# Patient Record
Sex: FEMALE | Race: WHITE | NOT HISPANIC OR LATINO | Employment: UNEMPLOYED | URBAN - METROPOLITAN AREA
[De-identification: names, ages, dates, MRNs, and addresses within clinical notes are randomized per-mention and may not be internally consistent; named-entity substitution may affect disease eponyms.]

---

## 2017-08-01 LAB — CRC RECOMMENDATION EXT: NORMAL

## 2021-06-04 ENCOUNTER — TELEPHONE (OUTPATIENT)
Dept: DERMATOLOGY | Facility: CLINIC | Age: 56
End: 2021-06-04

## 2022-07-06 ENCOUNTER — OFFICE VISIT (OUTPATIENT)
Dept: FAMILY MEDICINE | Facility: CLINIC | Age: 57
End: 2022-07-06
Payer: MEDICAID

## 2022-07-06 ENCOUNTER — PATIENT MESSAGE (OUTPATIENT)
Dept: FAMILY MEDICINE | Facility: CLINIC | Age: 57
End: 2022-07-06

## 2022-07-06 ENCOUNTER — HOSPITAL ENCOUNTER (OUTPATIENT)
Dept: RADIOLOGY | Facility: HOSPITAL | Age: 57
Discharge: HOME | End: 2022-07-06
Attending: INTERNAL MEDICINE
Payer: MEDICAID

## 2022-07-06 VITALS
SYSTOLIC BLOOD PRESSURE: 130 MMHG | DIASTOLIC BLOOD PRESSURE: 82 MMHG | BODY MASS INDEX: 28.03 KG/M2 | HEART RATE: 77 BPM | HEIGHT: 66 IN | OXYGEN SATURATION: 98 % | WEIGHT: 174.38 LBS

## 2022-07-06 DIAGNOSIS — Z12.31 ENCOUNTER FOR SCREENING MAMMOGRAM FOR BREAST CANCER: ICD-10-CM

## 2022-07-06 DIAGNOSIS — R10.11 RUQ PAIN: ICD-10-CM

## 2022-07-06 DIAGNOSIS — E78.2 MIXED HYPERLIPIDEMIA: ICD-10-CM

## 2022-07-06 DIAGNOSIS — Z23 NEED FOR VACCINATION: ICD-10-CM

## 2022-07-06 DIAGNOSIS — L71.9 ROSACEA: ICD-10-CM

## 2022-07-06 DIAGNOSIS — K80.50 BILIARY COLIC: ICD-10-CM

## 2022-07-06 DIAGNOSIS — Z76.89 ENCOUNTER TO ESTABLISH CARE: Primary | ICD-10-CM

## 2022-07-06 PROCEDURE — 99214 OFFICE O/P EST MOD 30 MIN: CPT | Mod: PBBFAC,PO | Performed by: INTERNAL MEDICINE

## 2022-07-06 PROCEDURE — 99999 PR PBB SHADOW E&M-EST. PATIENT-LVL IV: ICD-10-PCS | Mod: PBBFAC,,, | Performed by: INTERNAL MEDICINE

## 2022-07-06 PROCEDURE — 77067 MAMMO DIGITAL SCREENING BILAT WITH TOMO: ICD-10-PCS | Mod: 26,,, | Performed by: RADIOLOGY

## 2022-07-06 PROCEDURE — 3008F PR BODY MASS INDEX (BMI) DOCUMENTED: ICD-10-PCS | Mod: CPTII,,, | Performed by: INTERNAL MEDICINE

## 2022-07-06 PROCEDURE — 90715 TDAP VACCINE 7 YRS/> IM: CPT | Mod: PBBFAC,PO

## 2022-07-06 PROCEDURE — 1160F RVW MEDS BY RX/DR IN RCRD: CPT | Mod: CPTII,,, | Performed by: INTERNAL MEDICINE

## 2022-07-06 PROCEDURE — 99204 PR OFFICE/OUTPT VISIT, NEW, LEVL IV, 45-59 MIN: ICD-10-PCS | Mod: S$PBB,,, | Performed by: INTERNAL MEDICINE

## 2022-07-06 PROCEDURE — 77063 BREAST TOMOSYNTHESIS BI: CPT | Mod: 26,,, | Performed by: RADIOLOGY

## 2022-07-06 PROCEDURE — 1160F PR REVIEW ALL MEDS BY PRESCRIBER/CLIN PHARMACIST DOCUMENTED: ICD-10-PCS | Mod: CPTII,,, | Performed by: INTERNAL MEDICINE

## 2022-07-06 PROCEDURE — 1159F MED LIST DOCD IN RCRD: CPT | Mod: CPTII,,, | Performed by: INTERNAL MEDICINE

## 2022-07-06 PROCEDURE — 99204 OFFICE O/P NEW MOD 45 MIN: CPT | Mod: S$PBB,,, | Performed by: INTERNAL MEDICINE

## 2022-07-06 PROCEDURE — 77067 SCR MAMMO BI INCL CAD: CPT | Mod: 26,,, | Performed by: RADIOLOGY

## 2022-07-06 PROCEDURE — 3079F DIAST BP 80-89 MM HG: CPT | Mod: CPTII,,, | Performed by: INTERNAL MEDICINE

## 2022-07-06 PROCEDURE — 77063 MAMMO DIGITAL SCREENING BILAT WITH TOMO: ICD-10-PCS | Mod: 26,,, | Performed by: RADIOLOGY

## 2022-07-06 PROCEDURE — 99999 PR PBB SHADOW E&M-EST. PATIENT-LVL IV: CPT | Mod: PBBFAC,,, | Performed by: INTERNAL MEDICINE

## 2022-07-06 PROCEDURE — 77063 BREAST TOMOSYNTHESIS BI: CPT | Mod: TC,PO

## 2022-07-06 PROCEDURE — 3075F SYST BP GE 130 - 139MM HG: CPT | Mod: CPTII,,, | Performed by: INTERNAL MEDICINE

## 2022-07-06 PROCEDURE — 1159F PR MEDICATION LIST DOCUMENTED IN MEDICAL RECORD: ICD-10-PCS | Mod: CPTII,,, | Performed by: INTERNAL MEDICINE

## 2022-07-06 PROCEDURE — 3079F PR MOST RECENT DIASTOLIC BLOOD PRESSURE 80-89 MM HG: ICD-10-PCS | Mod: CPTII,,, | Performed by: INTERNAL MEDICINE

## 2022-07-06 PROCEDURE — 3075F PR MOST RECENT SYSTOLIC BLOOD PRESS GE 130-139MM HG: ICD-10-PCS | Mod: CPTII,,, | Performed by: INTERNAL MEDICINE

## 2022-07-06 PROCEDURE — 77067 SCR MAMMO BI INCL CAD: CPT | Mod: TC,PO

## 2022-07-06 PROCEDURE — 3008F BODY MASS INDEX DOCD: CPT | Mod: CPTII,,, | Performed by: INTERNAL MEDICINE

## 2022-07-06 RX ORDER — METRONIDAZOLE 7.5 MG/G
GEL TOPICAL 2 TIMES DAILY
Qty: 45 G | Refills: 2 | Status: SHIPPED | OUTPATIENT
Start: 2022-07-06 | End: 2023-07-10 | Stop reason: SDUPTHER

## 2022-07-06 RX ORDER — DOXYCYCLINE 100 MG/1
100 CAPSULE ORAL 2 TIMES DAILY
COMMUNITY
Start: 2022-02-22 | End: 2022-07-11

## 2022-07-06 RX ORDER — METRONIDAZOLE 7.5 MG/G
GEL TOPICAL
COMMUNITY
Start: 2021-06-05 | End: 2022-07-11

## 2022-07-06 RX ORDER — METRONIDAZOLE 7.5 MG/G
1 GEL TOPICAL 2 TIMES DAILY
COMMUNITY
Start: 2022-06-07 | End: 2022-07-11

## 2022-07-06 RX ORDER — ATORVASTATIN CALCIUM 20 MG/1
20 TABLET, FILM COATED ORAL DAILY
Qty: 90 TABLET | Refills: 3 | Status: SHIPPED | OUTPATIENT
Start: 2022-07-06 | End: 2023-07-10 | Stop reason: SDUPTHER

## 2022-07-06 RX ORDER — DOXYCYCLINE 100 MG/1
100 CAPSULE ORAL 2 TIMES DAILY
Qty: 180 CAPSULE | Refills: 3 | Status: SHIPPED | OUTPATIENT
Start: 2022-07-06 | End: 2023-07-10 | Stop reason: SDUPTHER

## 2022-07-06 NOTE — PROGRESS NOTES
Subjective     Abigail Smith is a 56 y.o. old, female here for Establish Care and Annual Exam    Patient complains of RUQ discomfort consistently. She has a history of gall stones. She does everything she can diet wise but symptoms are getting worse. Pain radiates up to the shoulder. Diet is very healthy.    She has what sounds like a history of rosacea and has been on doxycycline and metrogel which works well.    She has been on statin therapy for HLD but off meds for awhile, needs refills and update on labs.    She is due for a mammogram. Colonoscopy was done 5 years ago, she has records she can scan in. Last pap smear was a few years ago in [place].    She moved to the area with her  to help take care of her grand daughter.     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    Review of Systems   Constitutional: Negative for chills and fever.   HENT: Negative for ear pain and sinus pain.    Eyes: Negative for blurred vision and pain.   Respiratory: Negative for cough and shortness of breath.    Cardiovascular: Negative for chest pain and palpitations.   Gastrointestinal: Positive for abdominal pain and nausea.   Genitourinary: Negative for dysuria and frequency.   Musculoskeletal: Negative for joint pain and myalgias.   Skin: Negative for itching and rash.   Neurological: Negative for weakness and headaches.   Endo/Heme/Allergies: Negative for environmental allergies. Does not bruise/bleed easily.   Psychiatric/Behavioral: Negative for depression. The patient does not have insomnia.        History reviewed. No pertinent past medical history.  Past Surgical History:   Procedure Laterality Date     SECTION  10/19/1994    HYSTERECTOMY  10/2012    Partial - cervix & ovaries remain    TONSILLECTOMY  2002     Review of patient's allergies indicates:   Allergen Reactions     "Iodine and iodide containing products Hives    Sulfa (sulfonamide antibiotics)     Scallops Nausea Only     Outpatient Medications Marked as Taking for the 7/6/22 encounter (Office Visit) with Paulino Hope MD   Medication Sig Dispense Refill    doxycycline (VIBRAMYCIN) 100 MG Cap Take 100 mg by mouth 2 (two) times daily.      metroNIDAZOLE (METROGEL) 0.75 % gel Apply 1 application topically 2 (two) times daily.      metroNIDAZOLE (METROGEL) 0.75 % gel        Social History     Tobacco Use    Smoking status: Never Smoker   Substance Use Topics    Alcohol use: Yes     Alcohol/week: 3.0 standard drinks     Types: 1 Glasses of wine, 2 Cans of beer per week    Drug use: Never     Family History   Problem Relation Age of Onset    Diabetes Mother     Hearing loss Mother     Hyperlipidemia Mother     Arthritis Father         RA    Hearing loss Father     Arthritis Sister         Diagnosed with RA at 25 years of age     Objective     /82   Pulse 77   Ht 5' 6" (1.676 m)   Wt 79.1 kg (174 lb 6.1 oz)   SpO2 98%   BMI 28.15 kg/m²   Physical Exam  Constitutional:       General: She is not in acute distress.     Appearance: Normal appearance. She is well-developed.   HENT:      Head: Normocephalic and atraumatic.   Eyes:      Conjunctiva/sclera: Conjunctivae normal.      Pupils: Pupils are equal, round, and reactive to light.   Neck:      Thyroid: No thyroid mass or thyromegaly.   Cardiovascular:      Rate and Rhythm: Normal rate and regular rhythm.      Heart sounds: Normal heart sounds. No murmur heard.  Pulmonary:      Effort: No respiratory distress.      Breath sounds: Normal breath sounds.   Chest:   Breasts:      Right: No supraclavicular adenopathy.      Left: No supraclavicular adenopathy.       Abdominal:      General: Bowel sounds are normal.      Palpations: Abdomen is soft.      Tenderness: There is no abdominal tenderness.   Musculoskeletal:         General: No deformity.      " Cervical back: Neck supple.   Lymphadenopathy:      Cervical: No cervical adenopathy.      Upper Body:      Right upper body: No supraclavicular adenopathy.      Left upper body: No supraclavicular adenopathy.   Skin:     General: Skin is warm and dry.      Findings: No rash.   Neurological:      Mental Status: She is alert and oriented to person, place, and time.   Psychiatric:         Behavior: Behavior normal.       Assessment and Plan     Encounter to establish care  -     Comprehensive Metabolic Panel; Future; Expected date: 07/06/2022  -     Lipid Panel; Future; Expected date: 07/06/2022  -     CBC Without Differential; Future; Expected date: 07/06/2022  -     Hemoglobin A1C; Future; Expected date: 07/06/2022    RUQ pain    Biliary colic  -     Ambulatory referral/consult to General Surgery; Future; Expected date: 07/13/2022    Encounter for screening mammogram for breast cancer  -     Mammo Digital Screening Bilat w/ Mark; Future; Expected date: 07/06/2022    Mixed hyperlipidemia  -     atorvastatin (LIPITOR) 20 MG tablet; Take 1 tablet (20 mg total) by mouth once daily.  Dispense: 90 tablet; Refill: 3    Rosacea    Need for vaccination  -     (In Office Administered) Tdap Vaccine    Other orders  -     metroNIDAZOLE (METROGEL) 0.75 % gel; Apply topically 2 (two) times daily.  Dispense: 45 g; Refill: 2  -     doxycycline (MONODOX) 100 MG capsule; Take 1 capsule (100 mg total) by mouth 2 (two) times daily.  Dispense: 180 capsule; Refill: 3      Follow up in about 1 year (around 7/6/2023).    ___________________  Paulion Hope MD  Internal Medicine and Pediatrics

## 2022-07-07 ENCOUNTER — PATIENT OUTREACH (OUTPATIENT)
Dept: ADMINISTRATIVE | Facility: HOSPITAL | Age: 57
End: 2022-07-07
Payer: MEDICAID

## 2022-07-08 ENCOUNTER — PATIENT MESSAGE (OUTPATIENT)
Dept: FAMILY MEDICINE | Facility: CLINIC | Age: 57
End: 2022-07-08
Payer: MEDICAID

## 2022-07-08 ENCOUNTER — TELEPHONE (OUTPATIENT)
Dept: SURGERY | Facility: CLINIC | Age: 57
End: 2022-07-08
Payer: MEDICAID

## 2022-07-08 NOTE — TELEPHONE ENCOUNTER
----- Message from Je Carlos sent at 7/8/2022  4:07 PM CDT -----  Type:  Sooner Appointment Request    Caller is requesting a sooner appointment.  Caller declined first available appointment listed below.  Caller will not accept being placed on the waitlist and is requesting a message be sent to doctor.    Name of Caller:  PT  When is the first available appointment?  unk  Symptoms:  referral gal bladder surgery  Best Call Back Number:  [phone]     Additional Information:  Please advise == thank you

## 2022-07-08 NOTE — TELEPHONE ENCOUNTER
Spoke with patient, scheduled with Dr Champagne on Monday, July 11 at 845 am in the [place] office.

## 2022-07-11 ENCOUNTER — OFFICE VISIT (OUTPATIENT)
Dept: SURGERY | Facility: CLINIC | Age: 57
End: 2022-07-11
Payer: MEDICAID

## 2022-07-11 VITALS — BODY MASS INDEX: 28.15 KG/M2 | HEIGHT: 66 IN

## 2022-07-11 DIAGNOSIS — K80.50 BILIARY COLIC: ICD-10-CM

## 2022-07-11 PROCEDURE — 1159F MED LIST DOCD IN RCRD: CPT | Mod: CPTII,,, | Performed by: SURGERY

## 2022-07-11 PROCEDURE — 1160F RVW MEDS BY RX/DR IN RCRD: CPT | Mod: CPTII,,, | Performed by: SURGERY

## 2022-07-11 PROCEDURE — 99204 OFFICE O/P NEW MOD 45 MIN: CPT | Mod: S$PBB,,, | Performed by: SURGERY

## 2022-07-11 PROCEDURE — 1159F PR MEDICATION LIST DOCUMENTED IN MEDICAL RECORD: ICD-10-PCS | Mod: CPTII,,, | Performed by: SURGERY

## 2022-07-11 PROCEDURE — 1160F PR REVIEW ALL MEDS BY PRESCRIBER/CLIN PHARMACIST DOCUMENTED: ICD-10-PCS | Mod: CPTII,,, | Performed by: SURGERY

## 2022-07-11 PROCEDURE — 3008F BODY MASS INDEX DOCD: CPT | Mod: CPTII,,, | Performed by: SURGERY

## 2022-07-11 PROCEDURE — 3044F PR MOST RECENT HEMOGLOBIN A1C LEVEL <7.0%: ICD-10-PCS | Mod: CPTII,,, | Performed by: SURGERY

## 2022-07-11 PROCEDURE — 3008F PR BODY MASS INDEX (BMI) DOCUMENTED: ICD-10-PCS | Mod: CPTII,,, | Performed by: SURGERY

## 2022-07-11 PROCEDURE — 3044F HG A1C LEVEL LT 7.0%: CPT | Mod: CPTII,,, | Performed by: SURGERY

## 2022-07-11 PROCEDURE — 99204 PR OFFICE/OUTPT VISIT, NEW, LEVL IV, 45-59 MIN: ICD-10-PCS | Mod: S$PBB,,, | Performed by: SURGERY

## 2022-07-11 PROCEDURE — 99999 PR PBB SHADOW E&M-EST. PATIENT-LVL III: ICD-10-PCS | Mod: PBBFAC,,, | Performed by: SURGERY

## 2022-07-11 PROCEDURE — 99999 PR PBB SHADOW E&M-EST. PATIENT-LVL III: CPT | Mod: PBBFAC,,, | Performed by: SURGERY

## 2022-07-11 PROCEDURE — 99213 OFFICE O/P EST LOW 20 MIN: CPT | Mod: PBBFAC,PO | Performed by: SURGERY

## 2022-07-11 RX ORDER — SODIUM CHLORIDE 9 MG/ML
INJECTION, SOLUTION INTRAVENOUS CONTINUOUS
Status: CANCELLED | OUTPATIENT
Start: 2022-07-11

## 2022-07-11 RX ORDER — METRONIDAZOLE 500 MG/100ML
500 INJECTION, SOLUTION INTRAVENOUS
Status: CANCELLED | OUTPATIENT
Start: 2022-07-11

## 2022-07-11 NOTE — PROGRESS NOTES
Subjective:       Patient ID: Abigail Smith is a 56 y.o. female.    Chief Complaint: Consult (ZBIGNIEW)      HPI  he 56-year-old female presents with a complaint of right upper quadrant pain which radiates into her back.  She has known cholelithiasis from her previous ultrasound but I do not have that report.  It was done in [place].  This has been going on for 8 years.  She has some associated diarrhea but no nausea or vomiting.  She denies any fever or chills with these attacks..  This happens about 3-4 times per year sometimes the attacks last as long as 2 weeks.  It can be brought on by food, especially fatty food and knots.  She lives in [place] 6 months of the year is a  this is where her ultrasounds were done.  She would like to have something done about this.    History reviewed. No pertinent past medical history.  Past Surgical History:   Procedure Laterality Date     SECTION  10/19/1994    HYSTERECTOMY  10/2012    Partial - cervix & ovaries remain    TONSILLECTOMY  2002         Current Outpatient Medications:     atorvastatin (LIPITOR) 20 MG tablet, Take 1 tablet (20 mg total) by mouth once daily., Disp: 90 tablet, Rfl: 3    doxycycline (MONODOX) 100 MG capsule, Take 1 capsule (100 mg total) by mouth 2 (two) times daily., Disp: 180 capsule, Rfl: 3    metroNIDAZOLE (METROGEL) 0.75 % gel, Apply topically 2 (two) times daily., Disp: 45 g, Rfl: 2    Review of patient's allergies indicates:   Allergen Reactions    Iodine and iodide containing products Hives    Sulfa (sulfonamide antibiotics)     Scallops Nausea Only       Family History   Problem Relation Age of Onset    Diabetes Mother     Hearing loss Mother     Hyperlipidemia Mother     Arthritis Father         RA    Hearing loss Father     Arthritis Sister         Diagnosed with RA at 25 years of age     Social History     Socioeconomic History    Marital status:    Tobacco Use    Smoking status: Never Smoker     Smokeless tobacco: Never Used   Substance and Sexual Activity    Alcohol use: Yes     Alcohol/week: 3.0 standard drinks     Types: 1 Glasses of wine, 2 Cans of beer per week    Drug use: Never    Sexual activity: Yes     Partners: Male     Birth control/protection: Partner-Vasectomy       Review of Systems   Constitutional: Negative.    Respiratory: Negative.    Cardiovascular: Negative.    Gastrointestinal: Positive for abdominal pain and diarrhea.   Genitourinary: Negative.    Neurological: Negative.      Objective:     Physical Exam  Constitutional:       General: She is not in acute distress.     Appearance: She is well-developed.   HENT:      Head: Normocephalic and atraumatic.   Eyes:      General: No scleral icterus.     Conjunctiva/sclera: Conjunctivae normal.      Pupils: Pupils are equal, round, and reactive to light.   Neck:      Thyroid: No thyromegaly.   Cardiovascular:      Rate and Rhythm: Normal rate and regular rhythm.      Heart sounds: No murmur heard.  Pulmonary:      Effort: Pulmonary effort is normal.      Breath sounds: Normal breath sounds. No wheezing or rales.   Abdominal:      General: Bowel sounds are normal. There is no distension.      Palpations: Abdomen is soft. There is no mass.      Tenderness: There is abdominal tenderness. There is no guarding (Right upper quadrant tenderness.) or rebound.      Hernia: No hernia is present.   Musculoskeletal:         General: Normal range of motion.      Cervical back: Normal range of motion.   Lymphadenopathy:      Cervical: No cervical adenopathy.   Skin:     General: Skin is warm and dry.      Findings: No erythema or rash.   Neurological:      Mental Status: She is alert and oriented to person, place, and time.   Psychiatric:         Behavior: Behavior normal.       Assessment:     Encounter Diagnosis   Name Primary?    Biliary colic        Plan:      1. Abdominal ultrasound.  2. Will plan to proceed with laparoscopic cholecystectomy  pending that ultrasound.  3. Risks and benefits of the planned procedure were discussed at length with the patient.  Risks and benefits of not proceeding with the procedure were discussed as well. All questions were answered. The patient expressed clear understanding and would like to proceed with the procedure as discussed.

## 2022-07-11 NOTE — H&P (VIEW-ONLY)
Subjective:       Patient ID: Abigail Smith is a 56 y.o. female.    Chief Complaint: Consult (ZBIGNIEW)      HPI  he 56-year-old female presents with a complaint of right upper quadrant pain which radiates into her back.  She has known cholelithiasis from her previous ultrasound but I do not have that report.  It was done in [place].  This has been going on for 8 years.  She has some associated diarrhea but no nausea or vomiting.  She denies any fever or chills with these attacks..  This happens about 3-4 times per year sometimes the attacks last as long as 2 weeks.  It can be brought on by food, especially fatty food and knots.  She lives in [place] 6 months of the year is a  this is where her ultrasounds were done.  She would like to have something done about this.    History reviewed. No pertinent past medical history.  Past Surgical History:   Procedure Laterality Date     SECTION  10/19/1994    HYSTERECTOMY  10/2012    Partial - cervix & ovaries remain    TONSILLECTOMY  2002         Current Outpatient Medications:     atorvastatin (LIPITOR) 20 MG tablet, Take 1 tablet (20 mg total) by mouth once daily., Disp: 90 tablet, Rfl: 3    doxycycline (MONODOX) 100 MG capsule, Take 1 capsule (100 mg total) by mouth 2 (two) times daily., Disp: 180 capsule, Rfl: 3    metroNIDAZOLE (METROGEL) 0.75 % gel, Apply topically 2 (two) times daily., Disp: 45 g, Rfl: 2    Review of patient's allergies indicates:   Allergen Reactions    Iodine and iodide containing products Hives    Sulfa (sulfonamide antibiotics)     Scallops Nausea Only       Family History   Problem Relation Age of Onset    Diabetes Mother     Hearing loss Mother     Hyperlipidemia Mother     Arthritis Father         RA    Hearing loss Father     Arthritis Sister         Diagnosed with RA at 25 years of age     Social History     Socioeconomic History    Marital status:    Tobacco Use    Smoking status: Never Smoker     Smokeless tobacco: Never Used   Substance and Sexual Activity    Alcohol use: Yes     Alcohol/week: 3.0 standard drinks     Types: 1 Glasses of wine, 2 Cans of beer per week    Drug use: Never    Sexual activity: Yes     Partners: Male     Birth control/protection: Partner-Vasectomy       Review of Systems   Constitutional: Negative.    Respiratory: Negative.    Cardiovascular: Negative.    Gastrointestinal: Positive for abdominal pain and diarrhea.   Genitourinary: Negative.    Neurological: Negative.      Objective:     Physical Exam  Constitutional:       General: She is not in acute distress.     Appearance: She is well-developed.   HENT:      Head: Normocephalic and atraumatic.   Eyes:      General: No scleral icterus.     Conjunctiva/sclera: Conjunctivae normal.      Pupils: Pupils are equal, round, and reactive to light.   Neck:      Thyroid: No thyromegaly.   Cardiovascular:      Rate and Rhythm: Normal rate and regular rhythm.      Heart sounds: No murmur heard.  Pulmonary:      Effort: Pulmonary effort is normal.      Breath sounds: Normal breath sounds. No wheezing or rales.   Abdominal:      General: Bowel sounds are normal. There is no distension.      Palpations: Abdomen is soft. There is no mass.      Tenderness: There is abdominal tenderness. There is no guarding (Right upper quadrant tenderness.) or rebound.      Hernia: No hernia is present.   Musculoskeletal:         General: Normal range of motion.      Cervical back: Normal range of motion.   Lymphadenopathy:      Cervical: No cervical adenopathy.   Skin:     General: Skin is warm and dry.      Findings: No erythema or rash.   Neurological:      Mental Status: She is alert and oriented to person, place, and time.   Psychiatric:         Behavior: Behavior normal.       Assessment:     Encounter Diagnosis   Name Primary?    Biliary colic        Plan:      1. Abdominal ultrasound.  2. Will plan to proceed with laparoscopic cholecystectomy  pending that ultrasound.  3. Risks and benefits of the planned procedure were discussed at length with the patient.  Risks and benefits of not proceeding with the procedure were discussed as well. All questions were answered. The patient expressed clear understanding and would like to proceed with the procedure as discussed.

## 2022-07-11 NOTE — PATIENT INSTRUCTIONS
Surgery is scheduled for 8/8/22 arrival time will be given by the the preop nurse.  The preop nurse will call you from [phone]  Nothing to eat or drink after midnight.  Someone to drive you home if you are same day surgery.    THE PREOP NURSE WILL CALL, SOMETIMES AS LATE AS 4 or 5 PM IN THE AFTERNOON THE DAY BEFORE SURGERY.    Bathe the night before and the morning of your procedure with a Chlorhexidine wash such as Hibiclens, can be purchased at most Pharmacy's no prescription needed.    Special Instruction:     Your surgery is scheduled at the [facility] at [address] [facility] [address] in [place] on the first floor.      Contact Toño Wiseman LPN for any questions or concerns. [phone]

## 2022-07-12 ENCOUNTER — HOSPITAL ENCOUNTER (OUTPATIENT)
Dept: RADIOLOGY | Facility: HOSPITAL | Age: 57
Discharge: HOME | End: 2022-07-12
Attending: INTERNAL MEDICINE
Payer: MEDICAID

## 2022-07-12 ENCOUNTER — HOSPITAL ENCOUNTER (OUTPATIENT)
Dept: RADIOLOGY | Facility: HOSPITAL | Age: 57
Discharge: HOME | End: 2022-07-12
Attending: SURGERY
Payer: MEDICAID

## 2022-07-12 DIAGNOSIS — K80.50 BILIARY COLIC: ICD-10-CM

## 2022-07-12 DIAGNOSIS — R92.8 ABNORMAL MAMMOGRAM OF RIGHT BREAST: ICD-10-CM

## 2022-07-12 PROCEDURE — 77065 MAMMO DIGITAL DIAGNOSTIC RIGHT WITH TOMO: ICD-10-PCS | Mod: 26,RT,, | Performed by: RADIOLOGY

## 2022-07-12 PROCEDURE — 77061 BREAST TOMOSYNTHESIS UNI: CPT | Mod: 26,RT,, | Performed by: RADIOLOGY

## 2022-07-12 PROCEDURE — 77065 DX MAMMO INCL CAD UNI: CPT | Mod: TC,PO,RT

## 2022-07-12 PROCEDURE — 77065 DX MAMMO INCL CAD UNI: CPT | Mod: 26,RT,, | Performed by: RADIOLOGY

## 2022-07-12 PROCEDURE — 76642 ULTRASOUND BREAST LIMITED: CPT | Mod: 26,RT,, | Performed by: RADIOLOGY

## 2022-07-12 PROCEDURE — 76705 ECHO EXAM OF ABDOMEN: CPT | Mod: TC,PO

## 2022-07-12 PROCEDURE — 76705 ECHO EXAM OF ABDOMEN: CPT | Mod: 26,,, | Performed by: RADIOLOGY

## 2022-07-12 PROCEDURE — 76642 US BREAST RIGHT LIMITED: ICD-10-PCS | Mod: 26,RT,, | Performed by: RADIOLOGY

## 2022-07-12 PROCEDURE — 76642 ULTRASOUND BREAST LIMITED: CPT | Mod: TC,PO,RT

## 2022-07-12 PROCEDURE — 77061 MAMMO DIGITAL DIAGNOSTIC RIGHT WITH TOMO: ICD-10-PCS | Mod: 26,RT,, | Performed by: RADIOLOGY

## 2022-07-12 PROCEDURE — 76705 US ABDOMEN LIMITED_GALLBLADDER: ICD-10-PCS | Mod: 26,,, | Performed by: RADIOLOGY

## 2022-08-05 ENCOUNTER — ANESTHESIA EVENT (OUTPATIENT)
Dept: SURGERY | Facility: HOSPITAL | Age: 57
End: 2022-08-05
Payer: MEDICAID

## 2022-08-08 ENCOUNTER — HOSPITAL ENCOUNTER (OUTPATIENT)
Facility: HOSPITAL | Age: 57
Discharge: HOME | End: 2022-08-08
Attending: SURGERY | Admitting: SURGERY
Payer: MEDICAID

## 2022-08-08 ENCOUNTER — ANESTHESIA (OUTPATIENT)
Dept: SURGERY | Facility: HOSPITAL | Age: 57
End: 2022-08-08
Payer: MEDICAID

## 2022-08-08 DIAGNOSIS — K80.50 BILIARY COLIC: Primary | ICD-10-CM

## 2022-08-08 PROCEDURE — 88304 PR  SURG PATH,LEVEL III: ICD-10-PCS | Mod: 26,,, | Performed by: PATHOLOGY

## 2022-08-08 PROCEDURE — 37000008 HC ANESTHESIA 1ST 15 MINUTES: Mod: PO | Performed by: SURGERY

## 2022-08-08 PROCEDURE — D9220A PRA ANESTHESIA: ICD-10-PCS | Mod: CRNA,,, | Performed by: NURSE ANESTHETIST, CERTIFIED REGISTERED

## 2022-08-08 PROCEDURE — 71000033 HC RECOVERY, INTIAL HOUR: Mod: PO | Performed by: SURGERY

## 2022-08-08 PROCEDURE — 36000709 HC OR TIME LEV III EA ADD 15 MIN: Mod: PO | Performed by: SURGERY

## 2022-08-08 PROCEDURE — 88304 TISSUE EXAM BY PATHOLOGIST: CPT | Performed by: PATHOLOGY

## 2022-08-08 PROCEDURE — 63600175 PHARM REV CODE 636 W HCPCS: Mod: PO | Performed by: ANESTHESIOLOGY

## 2022-08-08 PROCEDURE — 88304 TISSUE EXAM BY PATHOLOGIST: CPT | Mod: 26,,, | Performed by: PATHOLOGY

## 2022-08-08 PROCEDURE — 36000708 HC OR TIME LEV III 1ST 15 MIN: Mod: PO | Performed by: SURGERY

## 2022-08-08 PROCEDURE — 00790 ANES IPER UPR ABD NOS: CPT | Mod: PO | Performed by: SURGERY

## 2022-08-08 PROCEDURE — D9220A PRA ANESTHESIA: Mod: CRNA,,, | Performed by: NURSE ANESTHETIST, CERTIFIED REGISTERED

## 2022-08-08 PROCEDURE — 63600175 PHARM REV CODE 636 W HCPCS: Mod: PO | Performed by: SURGERY

## 2022-08-08 PROCEDURE — S0030 INJECTION, METRONIDAZOLE: HCPCS | Mod: PO | Performed by: SURGERY

## 2022-08-08 PROCEDURE — 47562 LAPAROSCOPIC CHOLECYSTECTOMY: CPT | Mod: ,,, | Performed by: SURGERY

## 2022-08-08 PROCEDURE — D9220A PRA ANESTHESIA: Mod: ANES,,, | Performed by: ANESTHESIOLOGY

## 2022-08-08 PROCEDURE — 63600175 PHARM REV CODE 636 W HCPCS: Mod: PO | Performed by: NURSE ANESTHETIST, CERTIFIED REGISTERED

## 2022-08-08 PROCEDURE — 25000003 PHARM REV CODE 250: Mod: PO | Performed by: NURSE ANESTHETIST, CERTIFIED REGISTERED

## 2022-08-08 PROCEDURE — 27201423 OPTIME MED/SURG SUP & DEVICES STERILE SUPPLY: Mod: PO | Performed by: SURGERY

## 2022-08-08 PROCEDURE — 25000003 PHARM REV CODE 250: Mod: PO | Performed by: ANESTHESIOLOGY

## 2022-08-08 PROCEDURE — D9220A PRA ANESTHESIA: ICD-10-PCS | Mod: ANES,,, | Performed by: ANESTHESIOLOGY

## 2022-08-08 PROCEDURE — 47562 PR LAP,CHOLECYSTECTOMY: ICD-10-PCS | Mod: ,,, | Performed by: SURGERY

## 2022-08-08 PROCEDURE — 25000003 PHARM REV CODE 250: Mod: PO | Performed by: SURGERY

## 2022-08-08 PROCEDURE — 37000009 HC ANESTHESIA EA ADD 15 MINS: Mod: PO | Performed by: SURGERY

## 2022-08-08 RX ORDER — EPHEDRINE SULFATE 50 MG/ML
INJECTION, SOLUTION INTRAVENOUS
Status: DISCONTINUED | OUTPATIENT
Start: 2022-08-08 | End: 2022-08-08

## 2022-08-08 RX ORDER — HYDROCODONE BITARTRATE AND ACETAMINOPHEN 5; 325 MG/1; MG/1
1 TABLET ORAL EVERY 6 HOURS PRN
Qty: 20 TABLET | Refills: 0 | Status: SHIPPED | OUTPATIENT
Start: 2022-08-08 | End: 2023-07-10

## 2022-08-08 RX ORDER — SODIUM CHLORIDE 9 MG/ML
INJECTION, SOLUTION INTRAVENOUS CONTINUOUS
Status: DISCONTINUED | OUTPATIENT
Start: 2022-08-08 | End: 2022-08-08

## 2022-08-08 RX ORDER — FENTANYL CITRATE 50 UG/ML
25 INJECTION, SOLUTION INTRAMUSCULAR; INTRAVENOUS EVERY 5 MIN PRN
Status: DISCONTINUED | OUTPATIENT
Start: 2022-08-08 | End: 2022-08-08 | Stop reason: HOSPADM

## 2022-08-08 RX ORDER — FENTANYL CITRATE 50 UG/ML
INJECTION, SOLUTION INTRAMUSCULAR; INTRAVENOUS
Status: DISCONTINUED | OUTPATIENT
Start: 2022-08-08 | End: 2022-08-08

## 2022-08-08 RX ORDER — DEXAMETHASONE SODIUM PHOSPHATE 4 MG/ML
8 INJECTION, SOLUTION INTRA-ARTICULAR; INTRALESIONAL; INTRAMUSCULAR; INTRAVENOUS; SOFT TISSUE
Status: COMPLETED | OUTPATIENT
Start: 2022-08-08 | End: 2022-08-08

## 2022-08-08 RX ORDER — ONDANSETRON 2 MG/ML
INJECTION INTRAMUSCULAR; INTRAVENOUS
Status: DISCONTINUED | OUTPATIENT
Start: 2022-08-08 | End: 2022-08-08

## 2022-08-08 RX ORDER — SCOLOPAMINE TRANSDERMAL SYSTEM 1 MG/1
1 PATCH, EXTENDED RELEASE TRANSDERMAL
Status: DISCONTINUED | OUTPATIENT
Start: 2022-08-08 | End: 2022-08-08 | Stop reason: HOSPADM

## 2022-08-08 RX ORDER — METRONIDAZOLE 500 MG/100ML
500 INJECTION, SOLUTION INTRAVENOUS
Status: COMPLETED | OUTPATIENT
Start: 2022-08-08 | End: 2022-08-08

## 2022-08-08 RX ORDER — MIDAZOLAM HYDROCHLORIDE 1 MG/ML
INJECTION INTRAMUSCULAR; INTRAVENOUS
Status: DISCONTINUED | OUTPATIENT
Start: 2022-08-08 | End: 2022-08-08

## 2022-08-08 RX ORDER — LIDOCAINE HCL/PF 100 MG/5ML
SYRINGE (ML) INTRAVENOUS
Status: DISCONTINUED | OUTPATIENT
Start: 2022-08-08 | End: 2022-08-08

## 2022-08-08 RX ORDER — HYDROMORPHONE HYDROCHLORIDE 2 MG/ML
0.2 INJECTION, SOLUTION INTRAMUSCULAR; INTRAVENOUS; SUBCUTANEOUS EVERY 5 MIN PRN
Status: DISCONTINUED | OUTPATIENT
Start: 2022-08-08 | End: 2022-08-08 | Stop reason: HOSPADM

## 2022-08-08 RX ORDER — PROPOFOL 10 MG/ML
VIAL (ML) INTRAVENOUS
Status: DISCONTINUED | OUTPATIENT
Start: 2022-08-08 | End: 2022-08-08

## 2022-08-08 RX ORDER — LIDOCAINE HYDROCHLORIDE 10 MG/ML
1 INJECTION, SOLUTION EPIDURAL; INFILTRATION; INTRACAUDAL; PERINEURAL ONCE
Status: DISCONTINUED | OUTPATIENT
Start: 2022-08-08 | End: 2022-08-08 | Stop reason: HOSPADM

## 2022-08-08 RX ORDER — KETAMINE HCL IN 0.9 % NACL 50 MG/5 ML
SYRINGE (ML) INTRAVENOUS
Status: DISCONTINUED | OUTPATIENT
Start: 2022-08-08 | End: 2022-08-08

## 2022-08-08 RX ORDER — SODIUM CHLORIDE 9 MG/ML
INJECTION, SOLUTION INTRAVENOUS CONTINUOUS
Status: DISCONTINUED | OUTPATIENT
Start: 2022-08-08 | End: 2022-08-08 | Stop reason: HOSPADM

## 2022-08-08 RX ORDER — SODIUM CHLORIDE, SODIUM LACTATE, POTASSIUM CHLORIDE, CALCIUM CHLORIDE 600; 310; 30; 20 MG/100ML; MG/100ML; MG/100ML; MG/100ML
INJECTION, SOLUTION INTRAVENOUS CONTINUOUS
Status: DISCONTINUED | OUTPATIENT
Start: 2022-08-08 | End: 2022-08-08 | Stop reason: HOSPADM

## 2022-08-08 RX ORDER — BUPIVACAINE HYDROCHLORIDE AND EPINEPHRINE 2.5; 5 MG/ML; UG/ML
INJECTION, SOLUTION EPIDURAL; INFILTRATION; INTRACAUDAL; PERINEURAL
Status: DISCONTINUED | OUTPATIENT
Start: 2022-08-08 | End: 2022-08-08 | Stop reason: HOSPADM

## 2022-08-08 RX ORDER — ROCURONIUM BROMIDE 10 MG/ML
INJECTION, SOLUTION INTRAVENOUS
Status: DISCONTINUED | OUTPATIENT
Start: 2022-08-08 | End: 2022-08-08

## 2022-08-08 RX ORDER — OXYCODONE HYDROCHLORIDE 5 MG/1
5 TABLET ORAL
Status: DISCONTINUED | OUTPATIENT
Start: 2022-08-08 | End: 2022-08-08 | Stop reason: HOSPADM

## 2022-08-08 RX ORDER — ACETAMINOPHEN 10 MG/ML
INJECTION, SOLUTION INTRAVENOUS
Status: DISCONTINUED | OUTPATIENT
Start: 2022-08-08 | End: 2022-08-08

## 2022-08-08 RX ORDER — KETOROLAC TROMETHAMINE 30 MG/ML
INJECTION, SOLUTION INTRAMUSCULAR; INTRAVENOUS
Status: DISCONTINUED | OUTPATIENT
Start: 2022-08-08 | End: 2022-08-08

## 2022-08-08 RX ADMIN — ONDANSETRON 4 MG: 2 INJECTION, SOLUTION INTRAMUSCULAR; INTRAVENOUS at 12:08

## 2022-08-08 RX ADMIN — KETOROLAC TROMETHAMINE 30 MG: 30 INJECTION, SOLUTION INTRAMUSCULAR at 12:08

## 2022-08-08 RX ADMIN — EPHEDRINE SULFATE 15 MG: 50 INJECTION INTRAVENOUS at 01:08

## 2022-08-08 RX ADMIN — PROPOFOL 170 MG: 10 INJECTION, EMULSION INTRAVENOUS at 12:08

## 2022-08-08 RX ADMIN — CEFTRIAXONE 2 G: 2 INJECTION, SOLUTION INTRAVENOUS at 12:08

## 2022-08-08 RX ADMIN — ROCURONIUM BROMIDE 30 MG: 10 INJECTION, SOLUTION INTRAVENOUS at 12:08

## 2022-08-08 RX ADMIN — FENTANYL CITRATE 100 MCG: 50 INJECTION, SOLUTION INTRAMUSCULAR; INTRAVENOUS at 12:08

## 2022-08-08 RX ADMIN — SCOPALAMINE 1 PATCH: 1 PATCH, EXTENDED RELEASE TRANSDERMAL at 11:08

## 2022-08-08 RX ADMIN — ACETAMINOPHEN 1000 MG: 10 INJECTION, SOLUTION INTRAVENOUS at 12:08

## 2022-08-08 RX ADMIN — Medication 25 MG: at 12:08

## 2022-08-08 RX ADMIN — METRONIDAZOLE 500 MG: 5 INJECTION, SOLUTION INTRAVENOUS at 11:08

## 2022-08-08 RX ADMIN — SUGAMMADEX 200 MG: 100 INJECTION, SOLUTION INTRAVENOUS at 01:08

## 2022-08-08 RX ADMIN — SODIUM CHLORIDE, SODIUM LACTATE, POTASSIUM CHLORIDE, AND CALCIUM CHLORIDE: .6; .31; .03; .02 INJECTION, SOLUTION INTRAVENOUS at 11:08

## 2022-08-08 RX ADMIN — DEXAMETHASONE SODIUM PHOSPHATE 8 MG: 4 INJECTION INTRA-ARTICULAR; INTRALESIONAL; INTRAMUSCULAR; INTRAVENOUS; SOFT TISSUE at 11:08

## 2022-08-08 RX ADMIN — LIDOCAINE HYDROCHLORIDE 100 MG: 20 INJECTION, SOLUTION INTRAVENOUS at 12:08

## 2022-08-08 RX ADMIN — MIDAZOLAM HYDROCHLORIDE 2 MG: 1 INJECTION, SOLUTION INTRAMUSCULAR; INTRAVENOUS at 12:08

## 2022-08-08 NOTE — ANESTHESIA POSTPROCEDURE EVALUATION
Anesthesia Post Evaluation    Patient: Abigail Smith    Procedure(s) Performed: Procedure(s) (LRB):  CHOLECYSTECTOMY, LAPAROSCOPIC (N/A)    Final Anesthesia Type: general      Patient location during evaluation: PACU  Patient participation: Yes- Able to Participate  Level of consciousness: awake and alert and oriented  Post-procedure vital signs: reviewed and stable  Pain management: adequate  Airway patency: patent    PONV status at discharge: No PONV  Anesthetic complications: no      Cardiovascular status: blood pressure returned to baseline and stable  Respiratory status: unassisted and spontaneous ventilation  Hydration status: euvolemic  Follow-up not needed.          Vitals Value Taken Time   /67 08/08/22 1339   Temp 36.2 °C (97.1 °F) 08/08/22 1326   Pulse 72 08/08/22 1339   Resp 18 08/08/22 1339   SpO2 96 % 08/08/22 1339         No case tracking events are documented in the log.      Pain/Aiyana Score: Aiyana Score: 10 (8/8/2022  1:45 PM)

## 2022-08-08 NOTE — ANESTHESIA PREPROCEDURE EVALUATION
08/08/2022  Abigail Smith is a 57 y.o., female.      Pre-op Assessment    I have reviewed the Patient Summary Reports.     I have reviewed the Nursing Notes. I have reviewed the NPO Status.   I have reviewed the Medications.     Review of Systems  Anesthesia Hx:  No problems with previous Anesthesia    Social:  Non-Smoker    Cardiovascular:   hyperlipidemia    Pulmonary:  Pulmonary Normal    Renal/:  Renal/ Normal     Neurological:  Neurology Normal    Endocrine:   Diabetes, well controlled, type 2        Physical Exam  General: Well nourished, Cooperative, Alert and Oriented    Airway:  Mallampati: I   Mouth Opening: Normal  Neck ROM: Normal ROM        Anesthesia Plan  Type of Anesthesia, risks & benefits discussed:    Anesthesia Type: Gen ETT, Gen Supraglottic Airway, Gen Natural Airway, MAC  Intra-op Monitoring Plan: Standard ASA Monitors  Post Op Pain Control Plan: multimodal analgesia  Induction:  IV  Airway Plan: Direct, Video and Fiberoptic, Post-Induction  Informed Consent: Informed consent signed with the Patient and all parties understand the risks and agree with anesthesia plan.  All questions answered.   ASA Score: 3    Ready For Surgery From Anesthesia Perspective.     .

## 2022-08-08 NOTE — TRANSFER OF CARE
"Anesthesia Transfer of Care Note    Patient: Abigail Smith    Procedure(s) Performed: Procedure(s) (LRB):  CHOLECYSTECTOMY, LAPAROSCOPIC (N/A)    Patient location: PACU    Anesthesia Type: general    Transport from OR: Transported from OR on room air with adequate spontaneous ventilation    Post pain: adequate analgesia    Post assessment: no apparent anesthetic complications and tolerated procedure well    Post vital signs: stable    Level of consciousness: sedated and awake    Nausea/Vomiting: no nausea/vomiting    Complications: none    Transfer of care protocol was followed      Last vitals:   Visit Vitals  /65   Pulse 73   Temp 36.2 °C (97.1 °F) (Skin)   Resp 19   Ht 5' 6" (1.676 m)   Wt 78.9 kg (174 lb)   SpO2 (!) 93%   Breastfeeding No   BMI 28.08 kg/m²     "

## 2022-08-08 NOTE — DISCHARGE SUMMARY
Discharge Summary  General Surgery      Admit Date: 8/8/2022    Discharge Date :8/8/2022    Attending Physician: Justin Champagne     Discharge Physician: Justin Champagne    Discharged Condition: good    Discharge Diagnosis: Biliary colic [K80.50]    Treatments/Procedures: Procedure(s) (LRB):  CHOLECYSTECTOMY, LAPAROSCOPIC (N/A)    Hospital Course: Uneventful; Discharged home from Recovery    Significant Diagnostic Studies: none    Disposition: Home or Self Care    Diet: Regular    Follow up: Office 10-14 days    Activity: No heavy lifting till seen in office.    Patient Instructions:   Current Discharge Medication List      START taking these medications    Details   HYDROcodone-acetaminophen (NORCO) 5-325 mg per tablet Take 1 tablet by mouth every 6 (six) hours as needed for Pain.  Qty: 20 tablet, Refills: 0    Comments: Quantity prescribed more than 7 day supply? No         CONTINUE these medications which have NOT CHANGED    Details   atorvastatin (LIPITOR) 20 MG tablet Take 1 tablet (20 mg total) by mouth once daily.  Qty: 90 tablet, Refills: 3    Associated Diagnoses: Mixed hyperlipidemia      doxycycline (MONODOX) 100 MG capsule Take 1 capsule (100 mg total) by mouth 2 (two) times daily.  Qty: 180 capsule, Refills: 3      metroNIDAZOLE (METROGEL) 0.75 % gel Apply topically 2 (two) times daily.  Qty: 45 g, Refills: 2             Discharge Procedure Orders   Diet general

## 2022-08-08 NOTE — ANESTHESIA PROCEDURE NOTES
Intubation    Date/Time: 8/8/2022 12:22 PM  Performed by: Maynor Delaney CRNA  Authorized by: Je Machado MD     Intubation:     Induction:  Intravenous    Intubated:  Postinduction    Mask Ventilation:  Easy mask    Attempts:  1    Attempted By:  CRNA    Method of Intubation:  Video laryngoscopy    Blade:  Hummel 3    Laryngeal View Grade: Grade I - full view of cords      Difficult Airway Encountered?: No      Complications:  None    Airway Device:  Oral endotracheal tube    Airway Device Size:  7.0    Style/Cuff Inflation:  Cuffed    Inflation Amount (mL):  5    Tube secured:  21    Secured at:  The lips    Placement Verified By:  Capnometry    Complicating Factors:  None    Findings Post-Intubation:  BS equal bilateral and atraumatic/condition of teeth unchanged

## 2022-08-08 NOTE — DISCHARGE INSTRUCTIONS
Department of General Surgery  [facility]  LAPAROSCOPIC CHOLECYSTECTOMY    DO:  Minimal activity for 48 hours.  Resume diet as tolerated.  May shower in 48 hours.  May remove outer dressing in 48 hours. Leave steri-strips in place. If steri-strips get wet, pat them dry. If they fall off, do not worry. They will fall off on their own. Do not pull them off.  Resume home medications as prescribed.    DO NOT:  Do not lift anything over 15 pounds until you have been released by your physician.  Do not soak in tub or pool.  Do not drive for 24 hours or while taking narcotic pain medication.  Do not take additional tylenol/acetaminophen while taking narcotic pain medication that contains tylenol/acetaminophen.     CALL PHYSICIAN FOR:  Redness, swelling, or bleeding from surgical site.  Fever greater than 101.  Drainage from the incision site.  Persistent pain not relieved by pain medication.    FOLLOW-UP APPOINTMENT: Call to schedule appointment in 10-14 days.    FOR EMERGENCIES: Contact your doctor at [phone].

## 2022-08-08 NOTE — OP NOTE
PATIENT NAME:  Abigail Smith     DATE OF PROCEDURE: 8/8/2022    PROCEDURE: Laparoscopic Cholecystectomy    PREOPERATIVE DIAGNOSIS: Cholelithiasis / Cholecystitis   POSTOPERATIVE DIAGNOSIS: Cholelithiasis / Cholecystitis     SURGEON: Justin Ambrocio Jr., M.D.  ASSISTANT: None     DESCRIPTION OF PROCEDURE: After appropriate consent was obtained, consent forms   signed and questions answered, the patient was taken to the Operating Room and   placed on the operating room table where general endotracheal anesthesia was   induced without difficulty. Preoperative antibiotics were administered. SCDs were in   place. A Timeout procedure was performed. The abdomen was prepped and draped in the usual sterile fashion. A midline incision was made beneath the umbilicus. Dissection was performed down to the fascia, which was incised. Stay sutures were placed on the fascia and the Jamin trocar was inserted. The abdomen was insufflated. Under direct   vision and after injection with local anesthetic, a 5 mm trocar was placed in   the upper midline. A second 5 mm trocar was placed between these two. The   gallbladder was identified, grasped, and retracted. There was some mild to moderate  inflammation. The cystic duct was identified and carefully dissected. Three clips   were placed on the common duct side, 2 on the gallbladder side, and the duct was   transected. The artery was identified, dissected, clipped and cut as well. The   gallbladder was then dissected free of the liver bed with electrocautery. It was   placed in a retrieval bag and removed through the umbilical port site. No bile   was spilled. The gallbladder fossa was examined and found to be hemostatic. The trocars were then removed under direct vision.The abdomen was desufflated. The fascia at the umbilicus was closed with interrupted 0 Vicryl suture and additional local was injected. The skin was then closed with 4-0 Monocryl subcuticular stitches. Steri-Strips were  then applied to all incisions. The patient was awakened, extubated and transferred to the Recovery Room in good condition. The patient tolerated the procedure without complication. Lap and instrument counts were reported as correct at the termination of the procedure.    EBL: 10 cc  SPECIMEN: gallbladder  COMPLICATIONS: none  ANESTHESIA: General

## 2022-08-09 VITALS
DIASTOLIC BLOOD PRESSURE: 67 MMHG | SYSTOLIC BLOOD PRESSURE: 139 MMHG | TEMPERATURE: 97 F | HEART RATE: 72 BPM | BODY MASS INDEX: 27.97 KG/M2 | OXYGEN SATURATION: 96 % | WEIGHT: 174 LBS | HEIGHT: 66 IN | RESPIRATION RATE: 18 BRPM

## 2022-08-11 LAB
FINAL PATHOLOGIC DIAGNOSIS: NORMAL
GROSS: NORMAL
Lab: NORMAL

## 2022-08-15 ENCOUNTER — OFFICE VISIT (OUTPATIENT)
Dept: SURGERY | Facility: CLINIC | Age: 57
End: 2022-08-15
Payer: MEDICAID

## 2022-08-15 VITALS
DIASTOLIC BLOOD PRESSURE: 80 MMHG | HEIGHT: 66 IN | TEMPERATURE: 99 F | BODY MASS INDEX: 27.1 KG/M2 | WEIGHT: 168.63 LBS | SYSTOLIC BLOOD PRESSURE: 131 MMHG | HEART RATE: 78 BPM

## 2022-08-15 DIAGNOSIS — Z98.890 POST-OPERATIVE STATE: Primary | ICD-10-CM

## 2022-08-15 PROCEDURE — 99999 PR PBB SHADOW E&M-EST. PATIENT-LVL III: CPT | Mod: PBBFAC,,, | Performed by: SURGERY

## 2022-08-15 PROCEDURE — 99999 PR PBB SHADOW E&M-EST. PATIENT-LVL III: ICD-10-PCS | Mod: PBBFAC,,, | Performed by: SURGERY

## 2022-08-15 PROCEDURE — 99213 OFFICE O/P EST LOW 20 MIN: CPT | Mod: PBBFAC,PO | Performed by: SURGERY

## 2022-08-15 PROCEDURE — 3075F SYST BP GE 130 - 139MM HG: CPT | Mod: CPTII,,, | Performed by: SURGERY

## 2022-08-15 PROCEDURE — 99024 PR POST-OP FOLLOW-UP VISIT: ICD-10-PCS | Mod: ,,, | Performed by: SURGERY

## 2022-08-15 PROCEDURE — 3075F PR MOST RECENT SYSTOLIC BLOOD PRESS GE 130-139MM HG: ICD-10-PCS | Mod: CPTII,,, | Performed by: SURGERY

## 2022-08-15 PROCEDURE — 3008F PR BODY MASS INDEX (BMI) DOCUMENTED: ICD-10-PCS | Mod: CPTII,,, | Performed by: SURGERY

## 2022-08-15 PROCEDURE — 3044F HG A1C LEVEL LT 7.0%: CPT | Mod: CPTII,,, | Performed by: SURGERY

## 2022-08-15 PROCEDURE — 1159F MED LIST DOCD IN RCRD: CPT | Mod: CPTII,,, | Performed by: SURGERY

## 2022-08-15 PROCEDURE — 3008F BODY MASS INDEX DOCD: CPT | Mod: CPTII,,, | Performed by: SURGERY

## 2022-08-15 PROCEDURE — 3079F PR MOST RECENT DIASTOLIC BLOOD PRESSURE 80-89 MM HG: ICD-10-PCS | Mod: CPTII,,, | Performed by: SURGERY

## 2022-08-15 PROCEDURE — 3079F DIAST BP 80-89 MM HG: CPT | Mod: CPTII,,, | Performed by: SURGERY

## 2022-08-15 PROCEDURE — 3044F PR MOST RECENT HEMOGLOBIN A1C LEVEL <7.0%: ICD-10-PCS | Mod: CPTII,,, | Performed by: SURGERY

## 2022-08-15 PROCEDURE — 1159F PR MEDICATION LIST DOCUMENTED IN MEDICAL RECORD: ICD-10-PCS | Mod: CPTII,,, | Performed by: SURGERY

## 2022-08-15 PROCEDURE — 99024 POSTOP FOLLOW-UP VISIT: CPT | Mod: ,,, | Performed by: SURGERY

## 2022-08-15 NOTE — PROGRESS NOTES
POST-OP NOTE    PROCEDURE: Lap cholecystectomy    COMPLAINTS: None.    EXAM: Incision well approximated. No drainage. No infection.      IMPRESSION:   Final Pathologic Diagnosis Gallbladder, cholecystectomy:   - Chronic cholecystitis with intestinal metaplasia, negative for dysplasia.   - Cholelithiasis.   - One benign reactive lymph node.          PLAN: FU as needed.

## 2022-12-21 ENCOUNTER — PATIENT MESSAGE (OUTPATIENT)
Dept: FAMILY MEDICINE | Facility: CLINIC | Age: 57
End: 2022-12-21
Payer: MEDICAID

## 2022-12-21 DIAGNOSIS — Z91.013 SEAFOOD ALLERGY: Primary | ICD-10-CM

## 2023-01-09 ENCOUNTER — PATIENT MESSAGE (OUTPATIENT)
Dept: FAMILY MEDICINE | Facility: CLINIC | Age: 58
End: 2023-01-09
Payer: MEDICAID

## 2023-01-13 ENCOUNTER — TELEPHONE (OUTPATIENT)
Dept: ALLERGY | Facility: CLINIC | Age: 58
End: 2023-01-13
Payer: MEDICAID

## 2023-01-13 NOTE — TELEPHONE ENCOUNTER
----- Message from Jose Perez sent at 1/13/2023  9:03 AM CST -----  Contact: self  Type: Sooner Appointment Request        Caller is requesting a sooner appointment. Caller declined first available appointment listed below. Caller will not accept being placed on the waitlist and is requesting a message be sent to doctor.        Name of Caller: Patient   Best Call Back Number: [phone]  Additional Information: Pt has a seafood allergy and wants  to get in to be scheduled she has medicaid I couldn't get a  date and wants to see when would be the next available to get in. Pt has a  referral on her chart as  well. Thanks

## 2023-02-15 NOTE — PROGRESS NOTES
ALLERGY & IMMUNOLOGY CLINIC -  NEW  PATIENT     HISTORY OF PRESENT ILLNESS     Patient ID: Abigail Smith is a 57 y.o. female    CC:   Chief Complaint   Patient presents with    Allergic Reaction     To scallops   Patient wants to discuss possible allergy testing to seafood        HPI: Abigail Smith is a 57 y.o. female presents for evaluation of:    Food Allergy:  -Scallops: out to dinner several years ago and consumed a dish with scallops and she developed immediate onset hot flashes, sweating episodes and facial flushing. Had another course of scallops in the same meal and had an even more severe reaction the next time, similar symptoms. Did not require EpiPen. Denies resp/gi symptoms at the time. Strict avoidance since of bi-valves, but tolerates crustaceans, shellfish, finned fish and all other foods. Concerned about introducing crawfish this spring. Has avoided mussels, oysters and scallops since that time.    Rhinitis: Endorses sneezing, nasal congestion and runny nose. No seasonal worsening and no known triggers except possibly long haired dogs.     Iodine: Developed diffuse onset urticaria after having IV contrast int he /. Strict avoidance since that time    Sulfa: As an infant, unknown reaction     H/o Asthma: denies  H/o Eczema: denies  Oral Allergy:  denies  Venom Allergy: denies  Latex Allergy: denies  Env/Occ: denies any environmental or occupational exposures     REVIEW OF SYSTEMS     Balance of review of systems negative except as mentioned above     MEDICAL HISTORY     MedHx: active problems reviewed  SurgHx:   Past Surgical History:   Procedure Laterality Date     SECTION  10/19/1994    HYSTERECTOMY  10/2012    Partial - cervix & ovaries remain    LAPAROSCOPIC CHOLECYSTECTOMY N/A 2022    Procedure: CHOLECYSTECTOMY, LAPAROSCOPIC;  Surgeon: Justin Champagne MD;  Location: [facility] OR;  Service: General;  Laterality: N/A;    TONSILLECTOMY  2002       SocHx:   -Denies smoking history  "and illicit drug use  -Alcohol Use:  Occasional   -Pets: DOg  -School/Work:  retired     FamHx:   -Food Allergy: Both parents  Otherwise no Family History of asthma, allergic rhinitis, atopic dermatitis, drug allergy, food allergy, venom allergy or immune deficiency.     Allergies: see below  Medications: MAR reviewed       PHYSICAL EXAM     VS: /74 (BP Location: Left arm, Patient Position: Sitting, BP Method: Large (Manual))   Ht 5' 6" (1.676 m)   Wt 79.2 kg (174 lb 7.9 oz)   BMI 28.16 kg/m²   GENERAL: awake, alert, cooperative with exam  EYES: no conjunctival injection, no discharge, no infraorbital shiners  LUNGS: CTAB, no w/r/c, no increased WOB  HEART: Normal Rate and regular rhythm, normal S1/S2, no m/g/r  EXTREMITIES: +2 distal pulses, no c/c/e  DERM: no rashes, no skin breaks     ALLERGEN TESTING     Immunocaps: Ordered Today     ASSESSMENT & PLAN     Abigail Smith is a 57 y.o. female with     Seafood allergy- Cutaneous-only symptoms with strict avoidance of all bi-valves but tolerating other shellfish without issues. Given strict avoidance will order serum specific IgE for bi-valves today to determine whether at home vs in-office challenge may be warranted. Prescribe EpiPen if needed  -     Ambulatory referral/consult to Allergy  -     Clams IgE; Future; Expected date: 02/16/2023  -     Oyster IgE; Future; Expected date: 02/16/2023  -     Allergen-Scallop; Future; Expected date: 02/16/2023  -     Crayfish, freshwater IgE; Future; Expected date: 02/16/2023    Chronic rhinitis- Likely allergic vs vasomotor, screen with area 6 panel today. Symptoms well controlled so likely does not need daily intranasal steroid  -     Dermatophagoides [place]; Future; Expected date: 02/16/2023  -     Dermatophagoides Pteronyssinus; Future; Expected date: 02/16/2023  -     [place]; Future; Expected date: 02/16/2023  -     [place]; Future; Expected date: 02/16/2023  -     [place]; Future; Expected date: " 02/16/2023  -     English Plantain; Future; Expected date: 02/16/2023  -     [place]; Future; Expected date: 02/16/2023  -     Pecan; Future; Expected date: 02/16/2023  -     Cronin Elder; Future; Expected date: 02/16/2023  -     Ragweed; Future; Expected date: 02/16/2023  -     Alternaria; Future; Expected date: 02/16/2023  -     Aspergillus; Future; Expected date: 02/16/2023  -     Cat; Future; Expected date: 02/16/2023  -     Cockroach; Future; Expected date: 02/16/2023  -     Dog; Future; Expected date: 02/16/2023  -     IgE; Future; Expected date: 02/16/2023    Adverse reaction to iodine- Remote history of cutaneous only symptoms, common in previous preparations that were ionic and contained an osmolarity content. If Iodine is needed, recommend pre-medication with the following:  If Scheduled:  Oral prednisone 50 mg at 13, 7, and 1 hour prior to contrast administration.  Diphenhydramine 50 mg oral, IM, or IV 1 hour prior to contrast administration.    If Urgent:   Hydrocortisone 200 mg IV 5 hours and 1 hour prior to contrast administration and diphenhydramine 50 mg IV 1 hour prior to contrast administration.*    Drug allergy- Unclear if true allergy vs viral exanthem in childhood. Could consider observed challenge to determine if IgE mediated reaction        Follow up: As needed      Juancho Fine MD  Department of Allergy&Immunology

## 2023-02-16 ENCOUNTER — LAB VISIT (OUTPATIENT)
Dept: LAB | Facility: HOSPITAL | Age: 58
End: 2023-02-16
Attending: STUDENT IN AN ORGANIZED HEALTH CARE EDUCATION/TRAINING PROGRAM
Payer: MEDICAID

## 2023-02-16 ENCOUNTER — OFFICE VISIT (OUTPATIENT)
Dept: ALLERGY | Facility: CLINIC | Age: 58
End: 2023-02-16
Payer: MEDICAID

## 2023-02-16 VITALS
BODY MASS INDEX: 28.04 KG/M2 | SYSTOLIC BLOOD PRESSURE: 118 MMHG | WEIGHT: 174.5 LBS | DIASTOLIC BLOOD PRESSURE: 74 MMHG | HEIGHT: 66 IN

## 2023-02-16 DIAGNOSIS — J31.0 CHRONIC RHINITIS: ICD-10-CM

## 2023-02-16 DIAGNOSIS — Z91.013 SEAFOOD ALLERGY: Primary | ICD-10-CM

## 2023-02-16 DIAGNOSIS — Z88.9 DRUG ALLERGY: ICD-10-CM

## 2023-02-16 DIAGNOSIS — T49.0X5A ADVERSE REACTION TO IODINE: ICD-10-CM

## 2023-02-16 DIAGNOSIS — Z91.013 SEAFOOD ALLERGY: ICD-10-CM

## 2023-02-16 LAB — IGE SERPL-ACNC: 37 IU/ML (ref 0–100)

## 2023-02-16 PROCEDURE — 3008F PR BODY MASS INDEX (BMI) DOCUMENTED: ICD-10-PCS | Mod: CPTII,,, | Performed by: STUDENT IN AN ORGANIZED HEALTH CARE EDUCATION/TRAINING PROGRAM

## 2023-02-16 PROCEDURE — 99999 PR PBB SHADOW E&M-EST. PATIENT-LVL III: ICD-10-PCS | Mod: PBBFAC,,, | Performed by: STUDENT IN AN ORGANIZED HEALTH CARE EDUCATION/TRAINING PROGRAM

## 2023-02-16 PROCEDURE — 3074F PR MOST RECENT SYSTOLIC BLOOD PRESSURE < 130 MM HG: ICD-10-PCS | Mod: CPTII,,, | Performed by: STUDENT IN AN ORGANIZED HEALTH CARE EDUCATION/TRAINING PROGRAM

## 2023-02-16 PROCEDURE — 99204 PR OFFICE/OUTPT VISIT, NEW, LEVL IV, 45-59 MIN: ICD-10-PCS | Mod: S$PBB,,, | Performed by: STUDENT IN AN ORGANIZED HEALTH CARE EDUCATION/TRAINING PROGRAM

## 2023-02-16 PROCEDURE — 3074F SYST BP LT 130 MM HG: CPT | Mod: CPTII,,, | Performed by: STUDENT IN AN ORGANIZED HEALTH CARE EDUCATION/TRAINING PROGRAM

## 2023-02-16 PROCEDURE — 82785 ASSAY OF IGE: CPT | Performed by: STUDENT IN AN ORGANIZED HEALTH CARE EDUCATION/TRAINING PROGRAM

## 2023-02-16 PROCEDURE — 3078F DIAST BP <80 MM HG: CPT | Mod: CPTII,,, | Performed by: STUDENT IN AN ORGANIZED HEALTH CARE EDUCATION/TRAINING PROGRAM

## 2023-02-16 PROCEDURE — 86003 ALLG SPEC IGE CRUDE XTRC EA: CPT | Mod: 59 | Performed by: STUDENT IN AN ORGANIZED HEALTH CARE EDUCATION/TRAINING PROGRAM

## 2023-02-16 PROCEDURE — 3008F BODY MASS INDEX DOCD: CPT | Mod: CPTII,,, | Performed by: STUDENT IN AN ORGANIZED HEALTH CARE EDUCATION/TRAINING PROGRAM

## 2023-02-16 PROCEDURE — 3078F PR MOST RECENT DIASTOLIC BLOOD PRESSURE < 80 MM HG: ICD-10-PCS | Mod: CPTII,,, | Performed by: STUDENT IN AN ORGANIZED HEALTH CARE EDUCATION/TRAINING PROGRAM

## 2023-02-16 PROCEDURE — 99999 PR PBB SHADOW E&M-EST. PATIENT-LVL III: CPT | Mod: PBBFAC,,, | Performed by: STUDENT IN AN ORGANIZED HEALTH CARE EDUCATION/TRAINING PROGRAM

## 2023-02-16 PROCEDURE — 99213 OFFICE O/P EST LOW 20 MIN: CPT | Mod: PBBFAC,PO | Performed by: STUDENT IN AN ORGANIZED HEALTH CARE EDUCATION/TRAINING PROGRAM

## 2023-02-16 PROCEDURE — 99204 OFFICE O/P NEW MOD 45 MIN: CPT | Mod: S$PBB,,, | Performed by: STUDENT IN AN ORGANIZED HEALTH CARE EDUCATION/TRAINING PROGRAM

## 2023-02-16 PROCEDURE — 36415 COLL VENOUS BLD VENIPUNCTURE: CPT | Mod: PO | Performed by: STUDENT IN AN ORGANIZED HEALTH CARE EDUCATION/TRAINING PROGRAM

## 2023-02-16 PROCEDURE — 86003 ALLG SPEC IGE CRUDE XTRC EA: CPT | Performed by: STUDENT IN AN ORGANIZED HEALTH CARE EDUCATION/TRAINING PROGRAM

## 2023-02-16 PROCEDURE — 1159F MED LIST DOCD IN RCRD: CPT | Mod: CPTII,,, | Performed by: STUDENT IN AN ORGANIZED HEALTH CARE EDUCATION/TRAINING PROGRAM

## 2023-02-16 PROCEDURE — 1159F PR MEDICATION LIST DOCUMENTED IN MEDICAL RECORD: ICD-10-PCS | Mod: CPTII,,, | Performed by: STUDENT IN AN ORGANIZED HEALTH CARE EDUCATION/TRAINING PROGRAM

## 2023-02-20 LAB
A ALTERNATA IGE QN: 0.71 KU/L
A FUMIGATUS IGE QN: <0.1 KU/L
BERMUDA GRASS IGE QN: <0.1 KU/L
CAT DANDER IGE QN: 0.11 KU/L
CEDAR IGE QN: <0.1 KU/L
CLAM IGE QN: <0.1 KU/L
CRAWFISH IGE QN: <0.1 KU/L
D FARINAE IGE QN: <0.1 KU/L
D PTERONYSS IGE QN: <0.1 KU/L
DEPRECATED A ALTERNATA IGE RAST QL: ABNORMAL
DEPRECATED A FUMIGATUS IGE RAST QL: NORMAL
DEPRECATED BERMUDA GRASS IGE RAST QL: NORMAL
DEPRECATED CAT DANDER IGE RAST QL: ABNORMAL
DEPRECATED CEDAR IGE RAST QL: NORMAL
DEPRECATED CLAM IGE RAST QL: NORMAL
DEPRECATED CRAWFISH IGE RAST QL: NORMAL
DEPRECATED D FARINAE IGE RAST QL: NORMAL
DEPRECATED D PTERONYSS IGE RAST QL: NORMAL
DEPRECATED DOG DANDER IGE RAST QL: NORMAL
DEPRECATED ELDER IGE RAST QL: NORMAL
DEPRECATED ENGL PLANTAIN IGE RAST QL: NORMAL
DEPRECATED OYSTER IGE RAST QL: NORMAL
DEPRECATED PECAN/HICK TREE IGE RAST QL: NORMAL
DEPRECATED ROACH IGE RAST QL: NORMAL
DEPRECATED SCALLOP IGE RAST QL: NORMAL
DEPRECATED TIMOTHY IGE RAST QL: NORMAL
DEPRECATED WEST RAGWEED IGE RAST QL: ABNORMAL
DEPRECATED WHITE OAK IGE RAST QL: NORMAL
DOG DANDER IGE QN: <0.1 KU/L
ELDER IGE QN: 0.1 KU/L
ENGL PLANTAIN IGE QN: <0.1 KU/L
OYSTER IGE QN: <0.1 KU/L
PECAN/HICK TREE IGE QN: <0.1 KU/L
ROACH IGE QN: <0.1 KU/L
SCALLOP IGE QN: <0.1 KU/L
TIMOTHY IGE QN: <0.1 KU/L
WEST RAGWEED IGE QN: 0.18 KU/L
WHITE OAK IGE QN: <0.1 KU/L

## 2023-02-22 ENCOUNTER — PATIENT MESSAGE (OUTPATIENT)
Dept: ALLERGY | Facility: CLINIC | Age: 58
End: 2023-02-22
Payer: MEDICAID

## 2023-03-08 ENCOUNTER — PATIENT MESSAGE (OUTPATIENT)
Dept: FAMILY MEDICINE | Facility: CLINIC | Age: 58
End: 2023-03-08
Payer: MEDICAID

## 2023-03-08 DIAGNOSIS — M79.673 PAIN OF FOOT, UNSPECIFIED LATERALITY: Primary | ICD-10-CM

## 2023-05-23 ENCOUNTER — PATIENT MESSAGE (OUTPATIENT)
Dept: FAMILY MEDICINE | Facility: CLINIC | Age: 58
End: 2023-05-23
Payer: MEDICAID

## 2023-05-23 DIAGNOSIS — Z00.00 ROUTINE HEALTH MAINTENANCE: ICD-10-CM

## 2023-05-23 DIAGNOSIS — Z12.31 ENCOUNTER FOR SCREENING MAMMOGRAM FOR MALIGNANT NEOPLASM OF BREAST: ICD-10-CM

## 2023-05-23 DIAGNOSIS — E78.2 MIXED HYPERLIPIDEMIA: Primary | ICD-10-CM

## 2023-07-03 ENCOUNTER — LAB VISIT (OUTPATIENT)
Dept: LAB | Facility: HOSPITAL | Age: 58
End: 2023-07-03
Attending: INTERNAL MEDICINE
Payer: MEDICAID

## 2023-07-03 DIAGNOSIS — E78.2 MIXED HYPERLIPIDEMIA: ICD-10-CM

## 2023-07-03 DIAGNOSIS — Z00.00 ROUTINE HEALTH MAINTENANCE: ICD-10-CM

## 2023-07-03 LAB
ALBUMIN SERPL BCP-MCNC: 4.4 G/DL (ref 3.5–5.2)
ALP SERPL-CCNC: 67 U/L (ref 55–135)
ALT SERPL W/O P-5'-P-CCNC: 23 U/L (ref 10–44)
ANION GAP SERPL CALC-SCNC: 7 MMOL/L (ref 8–16)
AST SERPL-CCNC: 16 U/L (ref 10–40)
BASOPHILS # BLD AUTO: 0.03 K/UL (ref 0–0.2)
BASOPHILS NFR BLD: 0.4 % (ref 0–1.9)
BILIRUB SERPL-MCNC: 0.6 MG/DL (ref 0.1–1)
BUN SERPL-MCNC: 18 MG/DL (ref 6–20)
CALCIUM SERPL-MCNC: 9.5 MG/DL (ref 8.7–10.5)
CHLORIDE SERPL-SCNC: 105 MMOL/L (ref 95–110)
CHOLEST SERPL-MCNC: 198 MG/DL (ref 120–199)
CHOLEST/HDLC SERPL: 3.5 {RATIO} (ref 2–5)
CO2 SERPL-SCNC: 28 MMOL/L (ref 23–29)
CREAT SERPL-MCNC: 0.8 MG/DL (ref 0.5–1.4)
DIFFERENTIAL METHOD: ABNORMAL
EOSINOPHIL # BLD AUTO: 0.1 K/UL (ref 0–0.5)
EOSINOPHIL NFR BLD: 1.4 % (ref 0–8)
ERYTHROCYTE [DISTWIDTH] IN BLOOD BY AUTOMATED COUNT: 14 % (ref 11.5–14.5)
EST. GFR  (NO RACE VARIABLE): >60 ML/MIN/1.73 M^2
ESTIMATED AVG GLUCOSE: 120 MG/DL (ref 68–131)
GLUCOSE SERPL-MCNC: 129 MG/DL (ref 70–110)
HBA1C MFR BLD: 5.8 % (ref 4–5.6)
HCT VFR BLD AUTO: 41.4 % (ref 37–48.5)
HDLC SERPL-MCNC: 57 MG/DL (ref 40–75)
HDLC SERPL: 28.8 % (ref 20–50)
HGB BLD-MCNC: 13.2 G/DL (ref 12–16)
IMM GRANULOCYTES # BLD AUTO: 0.02 K/UL (ref 0–0.04)
IMM GRANULOCYTES NFR BLD AUTO: 0.3 % (ref 0–0.5)
LDLC SERPL CALC-MCNC: 111.4 MG/DL (ref 63–159)
LYMPHOCYTES # BLD AUTO: 3.2 K/UL (ref 1–4.8)
LYMPHOCYTES NFR BLD: 42.2 % (ref 18–48)
MCH RBC QN AUTO: 28.3 PG (ref 27–31)
MCHC RBC AUTO-ENTMCNC: 31.9 G/DL (ref 32–36)
MCV RBC AUTO: 89 FL (ref 82–98)
MONOCYTES # BLD AUTO: 0.4 K/UL (ref 0.3–1)
MONOCYTES NFR BLD: 5.2 % (ref 4–15)
NEUTROPHILS # BLD AUTO: 3.9 K/UL (ref 1.8–7.7)
NEUTROPHILS NFR BLD: 50.5 % (ref 38–73)
NONHDLC SERPL-MCNC: 141 MG/DL
NRBC BLD-RTO: 0 /100 WBC
PLATELET # BLD AUTO: 262 K/UL (ref 150–450)
PMV BLD AUTO: 11.3 FL (ref 9.2–12.9)
POTASSIUM SERPL-SCNC: 4.7 MMOL/L (ref 3.5–5.1)
PROT SERPL-MCNC: 7.8 G/DL (ref 6–8.4)
RBC # BLD AUTO: 4.66 M/UL (ref 4–5.4)
SODIUM SERPL-SCNC: 140 MMOL/L (ref 136–145)
TRIGL SERPL-MCNC: 148 MG/DL (ref 30–150)
WBC # BLD AUTO: 7.68 K/UL (ref 3.9–12.7)

## 2023-07-03 PROCEDURE — 80053 COMPREHEN METABOLIC PANEL: CPT | Performed by: INTERNAL MEDICINE

## 2023-07-03 PROCEDURE — 83036 HEMOGLOBIN GLYCOSYLATED A1C: CPT | Performed by: INTERNAL MEDICINE

## 2023-07-03 PROCEDURE — 85025 COMPLETE CBC W/AUTO DIFF WBC: CPT | Performed by: INTERNAL MEDICINE

## 2023-07-03 PROCEDURE — 80061 LIPID PANEL: CPT | Performed by: INTERNAL MEDICINE

## 2023-07-03 PROCEDURE — 36415 COLL VENOUS BLD VENIPUNCTURE: CPT | Mod: PO | Performed by: INTERNAL MEDICINE

## 2023-07-07 ENCOUNTER — HOSPITAL ENCOUNTER (OUTPATIENT)
Dept: RADIOLOGY | Facility: HOSPITAL | Age: 58
Discharge: HOME | End: 2023-07-07
Attending: INTERNAL MEDICINE
Payer: MEDICAID

## 2023-07-07 DIAGNOSIS — Z12.31 ENCOUNTER FOR SCREENING MAMMOGRAM FOR MALIGNANT NEOPLASM OF BREAST: ICD-10-CM

## 2023-07-07 PROCEDURE — 77063 MAMMO DIGITAL SCREENING BILAT WITH TOMO: ICD-10-PCS | Mod: 26,,, | Performed by: RADIOLOGY

## 2023-07-07 PROCEDURE — 77067 SCR MAMMO BI INCL CAD: CPT | Mod: TC,PO

## 2023-07-07 PROCEDURE — 77063 BREAST TOMOSYNTHESIS BI: CPT | Mod: 26,,, | Performed by: RADIOLOGY

## 2023-07-07 PROCEDURE — 77067 SCR MAMMO BI INCL CAD: CPT | Mod: 26,,, | Performed by: RADIOLOGY

## 2023-07-07 PROCEDURE — 77067 MAMMO DIGITAL SCREENING BILAT WITH TOMO: ICD-10-PCS | Mod: 26,,, | Performed by: RADIOLOGY

## 2023-07-10 ENCOUNTER — OFFICE VISIT (OUTPATIENT)
Dept: FAMILY MEDICINE | Facility: CLINIC | Age: 58
End: 2023-07-10
Attending: INTERNAL MEDICINE
Payer: MEDICAID

## 2023-07-10 ENCOUNTER — PATIENT MESSAGE (OUTPATIENT)
Dept: FAMILY MEDICINE | Facility: CLINIC | Age: 58
End: 2023-07-10

## 2023-07-10 VITALS
SYSTOLIC BLOOD PRESSURE: 118 MMHG | DIASTOLIC BLOOD PRESSURE: 80 MMHG | OXYGEN SATURATION: 99 % | HEART RATE: 73 BPM | BODY MASS INDEX: 28.31 KG/M2 | WEIGHT: 175.38 LBS

## 2023-07-10 DIAGNOSIS — M25.562 CHRONIC PAIN OF LEFT KNEE: ICD-10-CM

## 2023-07-10 DIAGNOSIS — E78.2 MIXED HYPERLIPIDEMIA: ICD-10-CM

## 2023-07-10 DIAGNOSIS — L71.9 ROSACEA: ICD-10-CM

## 2023-07-10 DIAGNOSIS — Z00.00 PHYSICAL EXAM, ROUTINE: Primary | ICD-10-CM

## 2023-07-10 DIAGNOSIS — G89.29 CHRONIC PAIN OF LEFT KNEE: ICD-10-CM

## 2023-07-10 PROCEDURE — 3044F HG A1C LEVEL LT 7.0%: CPT | Mod: CPTII,,, | Performed by: INTERNAL MEDICINE

## 2023-07-10 PROCEDURE — 1159F PR MEDICATION LIST DOCUMENTED IN MEDICAL RECORD: ICD-10-PCS | Mod: CPTII,,, | Performed by: INTERNAL MEDICINE

## 2023-07-10 PROCEDURE — 99999 PR PBB SHADOW E&M-EST. PATIENT-LVL IV: CPT | Mod: PBBFAC,,, | Performed by: INTERNAL MEDICINE

## 2023-07-10 PROCEDURE — 3044F PR MOST RECENT HEMOGLOBIN A1C LEVEL <7.0%: ICD-10-PCS | Mod: CPTII,,, | Performed by: INTERNAL MEDICINE

## 2023-07-10 PROCEDURE — 1160F PR REVIEW ALL MEDS BY PRESCRIBER/CLIN PHARMACIST DOCUMENTED: ICD-10-PCS | Mod: CPTII,,, | Performed by: INTERNAL MEDICINE

## 2023-07-10 PROCEDURE — 3008F BODY MASS INDEX DOCD: CPT | Mod: CPTII,,, | Performed by: INTERNAL MEDICINE

## 2023-07-10 PROCEDURE — 1160F RVW MEDS BY RX/DR IN RCRD: CPT | Mod: CPTII,,, | Performed by: INTERNAL MEDICINE

## 2023-07-10 PROCEDURE — 3074F PR MOST RECENT SYSTOLIC BLOOD PRESSURE < 130 MM HG: ICD-10-PCS | Mod: CPTII,,, | Performed by: INTERNAL MEDICINE

## 2023-07-10 PROCEDURE — 3079F DIAST BP 80-89 MM HG: CPT | Mod: CPTII,,, | Performed by: INTERNAL MEDICINE

## 2023-07-10 PROCEDURE — 3008F PR BODY MASS INDEX (BMI) DOCUMENTED: ICD-10-PCS | Mod: CPTII,,, | Performed by: INTERNAL MEDICINE

## 2023-07-10 PROCEDURE — 99999 PR PBB SHADOW E&M-EST. PATIENT-LVL IV: ICD-10-PCS | Mod: PBBFAC,,, | Performed by: INTERNAL MEDICINE

## 2023-07-10 PROCEDURE — 1159F MED LIST DOCD IN RCRD: CPT | Mod: CPTII,,, | Performed by: INTERNAL MEDICINE

## 2023-07-10 PROCEDURE — 99396 PR PREVENTIVE VISIT,EST,40-64: ICD-10-PCS | Mod: S$PBB,,, | Performed by: INTERNAL MEDICINE

## 2023-07-10 PROCEDURE — 99396 PREV VISIT EST AGE 40-64: CPT | Mod: S$PBB,,, | Performed by: INTERNAL MEDICINE

## 2023-07-10 PROCEDURE — 3074F SYST BP LT 130 MM HG: CPT | Mod: CPTII,,, | Performed by: INTERNAL MEDICINE

## 2023-07-10 PROCEDURE — 3079F PR MOST RECENT DIASTOLIC BLOOD PRESSURE 80-89 MM HG: ICD-10-PCS | Mod: CPTII,,, | Performed by: INTERNAL MEDICINE

## 2023-07-10 PROCEDURE — 99214 OFFICE O/P EST MOD 30 MIN: CPT | Mod: PBBFAC,PO | Performed by: INTERNAL MEDICINE

## 2023-07-10 RX ORDER — DOXYCYCLINE 100 MG/1
100 CAPSULE ORAL DAILY
Qty: 30 CAPSULE | Refills: 11 | Status: SHIPPED | OUTPATIENT
Start: 2023-07-10

## 2023-07-10 RX ORDER — METRONIDAZOLE 7.5 MG/G
GEL TOPICAL 2 TIMES DAILY
Qty: 45 G | Refills: 3 | Status: SHIPPED | OUTPATIENT
Start: 2023-07-10 | End: 2024-07-09

## 2023-07-10 RX ORDER — ATORVASTATIN CALCIUM 20 MG/1
20 TABLET, FILM COATED ORAL DAILY
Qty: 90 TABLET | Refills: 3 | Status: SHIPPED | OUTPATIENT
Start: 2023-07-10 | End: 2024-07-09

## 2023-07-10 NOTE — PROGRESS NOTES
Subjective     Abigail Smith is a 57 y.o. old, female here for a health maintenance visit.    56 y/o with PMH of prediabetes, HLD    Health Habits  Exercise and Activity: walking, playing soccer outside with grand daughter, has been limited due to R shoulder and L knee pain  Diet: very healthy!    Sexual Health  Sexually active: yes, no issues    Mental Health  Doing okay    History     Past Medical History:   Diagnosis Date    Diabetes mellitus     gestational; pre-diabetic as of 23    Gallstones     Mixed hyperlipidemia      Past Surgical History:   Procedure Laterality Date     SECTION  10/19/1994    CHOLECYSTECTOMY  2022    HYSTERECTOMY  10/2012    Partial - cervix & ovaries remain    LAPAROSCOPIC CHOLECYSTECTOMY N/A 2022    Procedure: CHOLECYSTECTOMY, LAPAROSCOPIC;  Surgeon: Justin Champagne MD;  Location: [facility];  Service: General;  Laterality: N/A;    TONSILLECTOMY  2002     Review of patient's allergies indicates:   Allergen Reactions    Iodine and iodide containing products Hives    Sulfa (sulfonamide antibiotics)     Scallops Nausea Only     Outpatient Medications Marked as Taking for the 7/10/23 encounter (Office Visit) with Paulino Hope MD   Medication Sig Dispense Refill    [DISCONTINUED] atorvastatin (LIPITOR) 20 MG tablet Take 1 tablet (20 mg total) by mouth once daily. 90 tablet 3    [DISCONTINUED] doxycycline (MONODOX) 100 MG capsule Take 1 capsule (100 mg total) by mouth 2 (two) times daily. 180 capsule 3    [DISCONTINUED] metroNIDAZOLE (METROGEL) 0.75 % gel Apply topically 2 (two) times daily. 45 g 2     Social History     Tobacco Use    Smoking status: Never    Smokeless tobacco: Never   Substance Use Topics    Alcohol use: Yes     Alcohol/week: 4.0 standard drinks     Types: 3 Glasses of wine, 1 Cans of beer per week     Comment: ocassional    Drug use: Never     Family History   Problem Relation Age of Onset    Diabetes Mother     Hearing loss Mother      Hyperlipidemia Mother     Arthritis Father         RA    Hearing loss Father     Arthritis Sister         Diagnosed with RA at 24 years of age       Review of Systems   Review of Systems   Constitutional:  Negative for chills and fever.   HENT:  Negative for ear pain and sinus pain.    Eyes:  Negative for blurred vision and pain.   Respiratory:  Negative for cough and shortness of breath.    Cardiovascular:  Negative for chest pain and palpitations.   Gastrointestinal:  Negative for abdominal pain and nausea.   Genitourinary:  Negative for dysuria and frequency.   Musculoskeletal:  Positive for joint pain. Negative for myalgias.   Skin:  Negative for itching and rash.   Neurological:  Negative for weakness and headaches.   Endo/Heme/Allergies:  Negative for environmental allergies. Does not bruise/bleed easily.   Psychiatric/Behavioral:  Negative for depression. The patient does not have insomnia.    Objective   /80   Pulse 73   Wt 79.5 kg (175 lb 6 oz)   SpO2 99%   BMI 28.31 kg/m²   Physical Exam  Constitutional:       General: She is not in acute distress.     Appearance: Normal appearance. She is well-developed.   HENT:      Head: Normocephalic and atraumatic.   Eyes:      Conjunctiva/sclera: Conjunctivae normal.      Pupils: Pupils are equal, round, and reactive to light.   Neck:      Thyroid: No thyroid mass or thyromegaly.   Cardiovascular:      Rate and Rhythm: Normal rate and regular rhythm.      Heart sounds: Normal heart sounds. No murmur heard.  Pulmonary:      Effort: No respiratory distress.      Breath sounds: Normal breath sounds.   Abdominal:      General: Bowel sounds are normal.      Palpations: Abdomen is soft.      Tenderness: There is no abdominal tenderness.   Musculoskeletal:         General: No deformity.      Cervical back: Neck supple.   Lymphadenopathy:      Cervical: No cervical adenopathy.      Upper Body:      Right upper body: No supraclavicular adenopathy.      Left upper  body: No supraclavicular adenopathy.   Skin:     General: Skin is warm and dry.      Findings: No rash.   Neurological:      Mental Status: She is alert and oriented to person, place, and time.   Psychiatric:         Behavior: Behavior normal.     Assessment and Plan     Physical exam, routine    Mixed hyperlipidemia  -     atorvastatin (LIPITOR) 20 MG tablet; Take 1 tablet (20 mg total) by mouth once daily.  Dispense: 90 tablet; Refill: 3    Chronic pain of left knee  -     Ambulatory referral/consult to Orthopedics; Future; Expected date: 07/17/2023  -     Ambulatory referral/consult to Physical/Occupational Therapy; Future; Expected date: 07/17/2023    Rosacea  -     doxycycline (MONODOX) 100 MG capsule; Take 1 capsule (100 mg total) by mouth once daily.  Dispense: 30 capsule; Refill: 11  -     metroNIDAZOLE (METROGEL) 0.75 % gel; Apply topically 2 (two) times daily.  Dispense: 45 g; Refill: 3      Follow up in about 1 year (around 7/10/2024).    ___________________  Paulino Hope MD  Internal Medicine and Pediatrics

## 2023-07-11 ENCOUNTER — TELEPHONE (OUTPATIENT)
Dept: ORTHOPEDICS | Facility: CLINIC | Age: 58
End: 2023-07-11
Payer: MEDICAID

## 2023-07-11 NOTE — TELEPHONE ENCOUNTER
Tried to call pt to let her know that we did not have a sooner NP Medicaid appt with Dr. Reyes than the one that is scheduled for 11/30/23.  No answer & No voicemail set up/jf 7/11/23

## 2023-07-12 ENCOUNTER — CLINICAL SUPPORT (OUTPATIENT)
Dept: REHABILITATION | Facility: HOSPITAL | Age: 58
End: 2023-07-12
Attending: INTERNAL MEDICINE
Payer: MEDICAID

## 2023-07-12 DIAGNOSIS — G89.29 CHRONIC PAIN OF LEFT KNEE: ICD-10-CM

## 2023-07-12 DIAGNOSIS — R29.898 WEAKNESS OF BOTH LOWER EXTREMITIES: ICD-10-CM

## 2023-07-12 DIAGNOSIS — M25.562 CHRONIC PAIN OF LEFT KNEE: ICD-10-CM

## 2023-07-12 PROCEDURE — 97161 PT EVAL LOW COMPLEX 20 MIN: CPT | Mod: PO

## 2023-07-12 PROCEDURE — 97110 THERAPEUTIC EXERCISES: CPT | Mod: PO

## 2023-07-12 NOTE — PLAN OF CARE
[facility]   Physical Therapy Initial Evaluation      Name: Abigail Smith  Clinic Number: [id number]    Therapy Diagnosis:   Encounter Diagnosis   Name Primary?    Chronic pain of left knee         Physician: Paulino Hope MD    Physician Orders: PT Eval and Treat   Medical Diagnosis from Referral: M25.562,G89.29 (ICD-10-CM) - Chronic pain of left knee  Evaluation Date: 2023  Authorization Period Expiration: 2024  Plan of Care Expiration: 2023  Progress Note Due: 2023  Visit # / Visits authorized:    FOTO: 1/3    Precautions: Standard     Time In: 1300  Time Out: 1400  Total Billable Time: 60 minutes    Subjective     Date of onset: 3 months ago    History of current condition - Abigail reports: a history of L knee pain for the past 3 months, especially when attempting to rise up from sitting. She also reports suffering a partial tear in her L patellar tendon approximately 12 years ago while she was serving as a . 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    Medications:   Abigail has a current medication list which includes the following prescription(s): atorvastatin, doxycycline, and metronidazole.    Allergies:   Review of patient's allergies indicates:   Allergen Reactions    Iodine and iodide containing products Hives    Sulfa (sulfonamide antibiotics)     Scallops Nausea Only        Objective      Range of Motion:   Knee Left   Active 0-140     Lower Extremity Strength  Right LE  Left LE    Knee extension: 5/5 Knee extension: 5/5   Knee flexion: 5/5 Knee flexion: 5/5   Hip flexion: 5/5 Hip flexion: 5/5   Hip extension:  4+/5 Hip extension: 4+/5   Hip abduction: 5/5 Hip abduction: 5/5           Special Tests:   Left   Valgus Stress Test neg   Varus Stress test neg   Lachman's test neg   Posterior Lachman neg   Denise's Test pos   Apley's Compression neg   Patellar Grind Test positive     Step down test: 3/5- trunk lean, knee over toes      Joint Mobility: Decreased lateral patellar glide      Palpation: tender to palpation L patellar tendon and L anterior pole of lateral meniscus    Sensation: SILT        Intake Outcome Measure for FOTO Knee Survey    Therapist reviewed FOTO scores for Abigail Smith on 7/12/2023.   FOTO documents entered into EPIC - see Media section.    Intake Score: 60%         Treatment     Total Treatment time (time-based codes) separate from Evaluation: 25 minutes     Abigail received the treatments listed below:      therapeutic exercises to develop strength, endurance, ROM, flexibility, and posture for 25 minutes including:  Standing firehydrants c GTB 20x  Forward step downs 20x BLE  Supine hamstring stretch BLE 3x30"  Prone quad stretch 3x30" BLE  Standing hip extensions 2x10 c GTB      Patient Education and Home Exercises     Education provided:   - On HEP and POC    Written Home Exercises Provided: yes. Exercises " were reviewed and Abigail was able to demonstrate them prior to the end of the session.  Abigail demonstrated good  understanding of the education provided. See EMR under Patient Instructions for exercises provided during therapy sessions.    Assessment     Abigail is a 57 y.o. female referred to outpatient Physical Therapy with a medical diagnosis of M25.562,G89.29 (ICD-10-CM) - Chronic pain of left knee. Patient presents with increased pain and decreased strength and flexibility. These deficits limit her ability to perform everyday activities to include rising from sitting, walking, bending, extending, and navigating stairs without pain or limitations. Patient noted to present with special tests indicative of possible meniscal tear due to positive Brannon's test, mechanical symptoms, and tenderness to palpation of L Lateral meniscus. Patient also noted to be tender to palpation of her L lateral patellar tendon with significant reproduction of pain upon deep palpation to this area. Upon assessment of strength, patient with mild strength deficits in bilateral hip extensors, but WNL in all other groups and WNL in L knee active range of motion. 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 Due to current deficits that limit her ability to function without pain, patient will benefit from skilled Patient to restore function and improve mobility going forward.    Patient prognosis is Good.   Patient will benefit from skilled outpatient Physical Therapy to address the deficits stated above and in the chart below, provide patient /family education, and to maximize patientt's level of independence.     Plan of care discussed with patient: Yes  Patient's spiritual, cultural and educational needs considered and patient is agreeable to the plan of care and goals as stated below:     Anticipated Barriers for therapy: None    Medical  Necessity is demonstrated by the following  History  Co-morbidities and personal factors that may impact the plan of care [x] LOW: no personal factors / co-morbidities  [] MODERATE: 1-2 personal factors / co-morbidities  [] HIGH: 3+ personal factors / co-morbidities    Moderate / High Support Documentation:   Co-morbidities affecting plan of care:     Personal Factors:   lifestyle     Examination  Body Structures and Functions, activity limitations and participation restrictions that may impact the plan of care [x] LOW: addressing 1-2 elements  [] MODERATE: 3+ elements  [] HIGH: 4+ elements (please support below)    Moderate / High Support Documentation:      Clinical Presentation [x] LOW: stable  [] MODERATE: Evolving  [] HIGH: Unstable     Decision Making/ Complexity Score: low       Goals:  Short Term Goals: 5 weeks   Patient to report worst pain 2/10 when rising from sitting   Patient to improve B hip extension strength to 5/5  Patient to perform forward step down from 6in box without trunk lean  Patient to improve FOTO by 10%    Long Term Goals: 10 weeks   Patient to report worst pain 0/10 when rising from sitting   Patient to perform forward step down from 6in box without trunk lean or knee vagus  Patient to improve FOTO by 20%  Patient to experience no pain to palpation of L patellar tenson or lat meniscus    Plan     Plan of care Certification: 7/12/2023 to 9/20/2023.    10 weeks requested due to [place] in [place]    Outpatient Physical Therapy 2 times weekly for 10 weeks to include the following interventions: Electrical Stimulation per contraindications cleared, Gait Training, Manual Therapy, Moist Heat/ Ice, Neuromuscular Re-ed, Patient Education, Self Care, Therapeutic Activities, and Therapeutic Exercise.       Rajiv Zarate, PT

## 2023-07-18 NOTE — PROGRESS NOTES
[facility]   Physical Therapy Treatment Note     Name: Abigail Smith  Clinic Number: [id number]    Therapy Diagnosis:   Encounter Diagnoses   Name Primary?    Chronic pain of left knee Yes    Weakness of both lower extremities      Physician: Paulino Hope MD    Visit Date: 7/19/2023  Physician Orders: PT Eval and Treat   Medical Diagnosis from Referral: M25.562,G89.29 (ICD-10-CM) - Chronic pain of left knee  Evaluation Date: 7/12/2023  Authorization Period Expiration: 7/9/2024  Plan of Care Expiration: 9/20/2023  Progress Note Due: 8/11/2023  Visit # / Visits authorized: 1/ 20  FOTO: 1/3    PTA Visit #: 1/5     Precautions: Standard     Time In: 1058 AM  Time Out: 1155 AM  Total Billable Time: 40 minutes (1:1 with PTA)    SUBJECTIVE     Pt reports: her knee is sore when rising from seated position or when negotiating stairs. Patient states otherwise her knee is pain free. Patient states she will be travelling for the next several weeks. She was compliant with home exercise program.  Response to previous treatment: no adverse effect   Functional change: too soon to determine     Pain: 0/10  Location: left knee     OBJECTIVE     Objective Measures updated at progress report unless specified.     Treatment     Abigail received the treatments listed below:      therapeutic exercises to develop strength, endurance, ROM, flexibility, posture, and core stabilization for 15 minutes including:  Upright bike x 5 minutes  SAQ 2x10, 3# (5 sec eccentric lower)   Standing hip extensions 2x10 c GTB    manual therapy techniques: Joint mobilizations were applied to the: L knee for 10 minutes, including:  Patellar and fat pad mobilizations     neuromuscular re-education activities to improve: Balance, Coordination, Kinesthetic, Sense, Proprioception, and Posture for 30 minutes. The following activities were included:  Quad set + SLR 2x10, 3# (5 sec eccentric lower)  S/L hip abduction 2x10, 3# (cues for  proper sidelying position  SL bridging 2x10, 3 sec hold     Standing firehydrants c GTB 20x  Forward step downs 20x BLE    2 to 1 quad eccentrics 2x10, 15#   Sit to stand from chair with 20# medicine ball to encourage anterior weight shift 2x10  Step ups (8 inch) 2x10  Lateral step tap (4 inch) 2x10  Wall sits 20 sec x 3 (to tolerance)      Patient Education and Home Exercises     Home Exercises Provided and Patient Education Provided     Education provided:   - HEP compliance     Written Home Exercises Provided: Patient instructed to cont prior HEP. Exercises were reviewed and Abigail was able to demonstrate them prior to the end of the session.  Abigail demonstrated good  understanding of the education provided. See EMR under Patient Instructions for exercises provided during therapy sessions    ASSESSMENT     Abigail challenged by eccentric quad focused exercises. No complaints of pain with specific motion but cues needed for slow, controlled lowering. Modified ROM of wall sit due to pain. Good tolerance to gluteal focused exercises. Updated HEP as patient will be travelling for the next several weeks. Patient driven to improve condition and return to higher level activities.     Abigail Is progressing well towards her goals.   Pt prognosis is Good.     Pt will continue to benefit from skilled outpatient physical therapy to address the deficits listed in the problem list box on initial evaluation, provide pt/family education and to maximize pt's level of independence in the home and community environment.     Pt's spiritual, cultural and educational needs considered and pt agreeable to plan of care and goals.     Anticipated barriers to physical therapy: none    Goals: Short Term Goals: 5 weeks   Patient to report worst pain 2/10 when rising from sitting   Patient to improve B hip extension strength to 5/5  Patient to perform forward step down from 6in box without trunk lean  Patient to improve FOTO by 10%     Long Term  Goals: 10 weeks   Patient to report worst pain 0/10 when rising from sitting   Patient to perform forward step down from 6in box without trunk lean or knee vagus  Patient to improve FOTO by 20%  Patient to experience no pain to palpation of L patellar tenson or lat meniscus    PLAN     Continue current POC with emphasis on decreasing knee pain while improving quad and hip strength.     Kassandra Glynn, PTA

## 2023-07-19 ENCOUNTER — CLINICAL SUPPORT (OUTPATIENT)
Dept: REHABILITATION | Facility: HOSPITAL | Age: 58
End: 2023-07-19
Attending: INTERNAL MEDICINE
Payer: MEDICAID

## 2023-07-19 DIAGNOSIS — G89.29 CHRONIC PAIN OF LEFT KNEE: Primary | ICD-10-CM

## 2023-07-19 DIAGNOSIS — M25.562 CHRONIC PAIN OF LEFT KNEE: Primary | ICD-10-CM

## 2023-07-19 DIAGNOSIS — R29.898 WEAKNESS OF BOTH LOWER EXTREMITIES: ICD-10-CM

## 2023-07-19 PROCEDURE — 97110 THERAPEUTIC EXERCISES: CPT | Mod: PO,CQ

## 2023-08-16 ENCOUNTER — CLINICAL SUPPORT (OUTPATIENT)
Dept: REHABILITATION | Facility: HOSPITAL | Age: 58
End: 2023-08-16
Attending: INTERNAL MEDICINE
Payer: MEDICAID

## 2023-08-16 DIAGNOSIS — G89.29 CHRONIC PAIN OF LEFT KNEE: Primary | ICD-10-CM

## 2023-08-16 DIAGNOSIS — R29.898 WEAKNESS OF BOTH LOWER EXTREMITIES: ICD-10-CM

## 2023-08-16 DIAGNOSIS — M25.562 CHRONIC PAIN OF LEFT KNEE: Primary | ICD-10-CM

## 2023-08-16 PROCEDURE — 97110 THERAPEUTIC EXERCISES: CPT | Mod: PO

## 2023-08-16 NOTE — PROGRESS NOTES
[facility]   Physical Therapy Treatment Note     Name: Abigail Smith  Clinic Number: [id number]    Therapy Diagnosis:   Encounter Diagnoses   Name Primary?    Chronic pain of left knee Yes    Weakness of both lower extremities      Physician: Paulino Hope MD    Visit Date: 8/16/2023  Physician Orders: PT Eval and Treat   Medical Diagnosis from Referral: M25.562,G89.29 (ICD-10-CM) - Chronic pain of left knee  Evaluation Date: 7/12/2023  Authorization Period Expiration: 7/9/2024  Plan of Care Expiration: 9/20/2023  Progress Note Due: 8/11/2023  Visit # / Visits authorized: 2/ 20  FOTO: 1/3    PTA Visit #: 1/5     Precautions: Standard     Time In: 1000 AM  Time Out: 1100 AM  Total Billable Time: 60 minutes     SUBJECTIVE     Pt reports: She feels good today, but states the wall sit exercise performed during previous session resulted in lingering pain. She states she is scheduled to leave for [facility] in 3 days where she will continue to perform her HEP while out of town.  She was compliant with home exercise program.  Response to previous treatment: no adverse effect   Functional change: too soon to determine     Pain: 0/10  Location: left knee     OBJECTIVE     Objective Measures updated at progress report unless specified.     Treatment     Abigail received the treatments listed below:    Billed per medicaid guidelines    therapeutic exercises to develop strength, endurance, ROM, flexibility, posture, and core stabilization for 25 minutes including:  Upright bike x 10 minutes  SAQ 2x10, 3# (5 sec eccentric lower)-NP  Standing hip extensions 3x10 Matrix  Standing hio abduction 3x10 Matrix    manual therapy techniques: Joint mobilizations were applied to the: L knee for 10 minutes, including:  Patellar and fat pad mobilizations   KT taping for neutral patellar alignment    neuromuscular re-education activities to improve: Balance, Coordination, Kinesthetic, Sense, Proprioception, and  Posture for 25 minutes. The following activities were included:  Quad set + SLR 2x10, 4# (5 sec eccentric lower)  S/L hip abduction 2x10, 4# (cues for proper sidelying position  SL bridging 2x10, 3 sec hold   Standing firehydrants c GTB 20x-NP  Forward step downs 20x BLE-NP  2 to 1 quad eccentrics 2x10, 15#   Sit to stand from chair with 20# medicine ball to encourage anterior weight shift 2x10  Step ups (12 inch) 2x10  Lateral step tap (4 inch) 2x10-NP  Wall sits 20 sec x 3 (to tolerance)-NP      Patient Education and Home Exercises     Home Exercises Provided and Patient Education Provided     Education provided:   - HEP compliance     Written Home Exercises Provided: Patient instructed to cont prior HEP. Exercises were reviewed and Abigail was able to demonstrate them prior to the end of the session.  Abigail demonstrated good  understanding of the education provided. See EMR under Patient Instructions for exercises provided during therapy sessions    ASSESSMENT     Continued to target eccentric controlled strengthening with progressions to stair negotiation exercises with good tolerance noted. Also incorporated kinesiotaping of Abigail's L patella in neutral alignment prior to today's interventions. Global hip strengthening interventions were also progressed to incorporate increased resistance with good tolerance. Pt was educated on continuation of POC while away in [facility] and verbalized understanding. Will follow back up with PT when she returns back from [facility] in 5 weeks    Abigail Is progressing well towards her goals.   Pt prognosis is Good.     Pt will continue to benefit from skilled outpatient physical therapy to address the deficits listed in the problem list box on initial evaluation, provide pt/family education and to maximize pt's level of independence in the home and community environment.     Pt's spiritual, cultural and educational needs considered and pt agreeable to plan of care and goals.     Anticipated  barriers to physical therapy: none    Goals: Short Term Goals: 5 weeks   Patient to report worst pain 2/10 when rising from sitting   Patient to improve B hip extension strength to 5/5  Patient to perform forward step down from 6in box without trunk lean  Patient to improve FOTO by 10%     Long Term Goals: 10 weeks   Patient to report worst pain 0/10 when rising from sitting   Patient to perform forward step down from 6in box without trunk lean or knee vagus  Patient to improve FOTO by 20%  Patient to experience no pain to palpation of L patellar tenson or lat meniscus    PLAN     Re-assess next visit    Continue current POC with emphasis on decreasing knee pain while improving quad and hip strength.     Rajiv Zarate, PT

## 2023-09-18 ENCOUNTER — CLINICAL SUPPORT (OUTPATIENT)
Dept: REHABILITATION | Facility: HOSPITAL | Age: 58
End: 2023-09-18
Payer: MEDICAID

## 2023-09-18 DIAGNOSIS — G89.29 CHRONIC PAIN OF LEFT KNEE: Primary | ICD-10-CM

## 2023-09-18 DIAGNOSIS — R29.898 WEAKNESS OF BOTH LOWER EXTREMITIES: ICD-10-CM

## 2023-09-18 DIAGNOSIS — M25.562 CHRONIC PAIN OF LEFT KNEE: Primary | ICD-10-CM

## 2023-09-18 PROCEDURE — 97110 THERAPEUTIC EXERCISES: CPT | Mod: PO

## 2023-09-18 NOTE — PLAN OF CARE
[facility]  Physical Therapy Plan of Care Note     Name: Abigail Smith  Clinic Number: [id number]    Therapy Diagnosis:   Encounter Diagnoses   Name Primary?    Chronic pain of left knee Yes    Weakness of both lower extremities      Physician: Paulino Hope MD    Visit Date: 9/18/2023    Visit Date: 9/18/2023  Physician Orders: PT Eval and Treat   Medical Diagnosis from Referral: M25.562,G89.29 (ICD-10-CM) - Chronic pain of left knee  Evaluation Date: 7/12/2023  Authorization Period Expiration: 7/9/2024  Plan of Care Expiration: 9/20/2023  Progress Note Due: 10/18/2023  Visit # / Visits authorized: 4/ 20  FOTO: 1/3    SUBJECTIVE     Update: Feeling jet-lagged today due to returning from [place] yesterday. She states that she notices increased swelling due to the long pain ride and experienced episodes of buckling during her trip, especially when climbing.    OBJECTIVE     Update: Range of Motion:   Knee Left   Active 0-145      Lower Extremity Strength  Right LE   Left LE     Knee extension: 5/5 Knee extension: 5/5   Knee flexion: 5/5 Knee flexion: 5/5   Hip flexion: 5/5 Hip flexion: 5/5   Hip extension:  4+/5 Hip extension: 4+/5   Hip abduction: 5/5 Hip abduction: 5/5          Intake Outcome Measure for FOTO Knee Survey     Therapist reviewed FOTO scores for Abigail Smith on 7/12/2023.   FOTO documents entered into [employer] - see Media section.     Intake Score: 60%          ASSESSMENT     Update: Re-assessment performed today. Pt with slight improvement in pain and strength as a result of current POC, but has not been able to attend many visits due to [place]. Although she presents with these improvements, she continues to experience increased pain and episodes of instability that limit her everyday function as a . Due to patient reporting episodes of buckling, performed test-retest of forward step down with kinesiotaping during trial 2 for patellar  stability. Pt with slight improvements following taping with this activity, but presented with lotion on her legs, which resulted in the tape to not adhere after 2-3 reps. At this time, pt presents with multiple S/S consistent with a meniscal tear and would benefit from receiving a MRI to assess soft tissue integrity. Due to these remaining deficits, pt will benefit an extension of current POC to 2x per week for 6 additional weeks to improve mobility, control pain, and restore overall function.      Previous Short Term Goals Status:   Continue per initial POC  New Short Term Goals Status:   Continue per initial POC  Long Term Goal Status: continue per initial plan of care.  Reasons for Recertification of Therapy:   Not all goals met    GOALS  Short Term Goals: 5 weeks   Patient to report worst pain 2/10 when rising from sitting-NEARING  Patient to improve B hip extension strength to 5/5-NEARING  Patient to perform forward step down from 6in box without trunk lean-MET  Patient to improve FOTO by 10%-IN PROGRESS     Long Term Goals: 10 weeks   Patient to report worst pain 0/10 when rising from sitting   Patient to perform forward step down from 6in box without trunk lean or knee vagus  Patient to improve FOTO by 20%  Patient to experience no pain to palpation of L patellar tenson or lat meniscus      PLAN     Updated Certification Period: 9/18/2023 to 10/30/2023   Recommended Treatment Plan: 2 times per week for 6 weeks:  Electrical Stimulation per contraindications cleared, Gait Training, Manual Therapy, Moist Heat/ Ice, Neuromuscular Re-ed, Patient Education, Self Care, Therapeutic Activities, and Therapeutic Exercise  Other Recommendations: Pt would benefit from MRI due to S/S consistent with meniscal lesion    Rajiv Zraate, PT

## 2023-09-18 NOTE — PROGRESS NOTES
[facility]   Physical Therapy Re-assessment    Name: Abigail Smith  Clinic Number: [id number]    Therapy Diagnosis:   Encounter Diagnoses   Name Primary?    Chronic pain of left knee Yes    Weakness of both lower extremities      Physician: Paulino Hope MD    Visit Date: 9/18/2023  Physician Orders: PT Eval and Treat   Medical Diagnosis from Referral: M25.562,G89.29 (ICD-10-CM) - Chronic pain of left knee  Evaluation Date: 7/12/2023  Authorization Period Expiration: 7/9/2024  Plan of Care Expiration: 9/20/2023  Progress Note Due: 10/18/2023  Visit # / Visits authorized: 4/ 20  FOTO: 1/3    PTA Visit #: 1/5     Precautions: Standard     Time In: 1300 AM  Time Out: 1400 AM  Total Billable Time: 60 minutes     SUBJECTIVE     Pt reports: Feeling jet-lagged today due to returning from [place] yesterday. She states that she notices increased swelling due to the long pain ride and experienced episodes of buckling during her trip, especially when climbing.    She was compliant with home exercise program.  Response to previous treatment: no adverse effect   Functional change: too soon to determine     Pain: 3/10  Location: left knee     OBJECTIVE     Objective Measures updated at progress report unless specified.     Range of Motion:   Knee Left   Active 0-145      Lower Extremity Strength  Right LE   Left LE     Knee extension: 5/5 Knee extension: 5/5   Knee flexion: 5/5 Knee flexion: 5/5   Hip flexion: 5/5 Hip flexion: 5/5   Hip extension:  4+/5 Hip extension: 4+/5   Hip abduction: 5/5 Hip abduction: 5/5          Intake Outcome Measure for FOTO Knee Survey     Therapist reviewed FOTO scores for Abigail Smith on 7/12/2023.   FOTO documents entered into [employer] - see Media section.     Intake Score: 60%           Treatment     Abigail received the treatments listed below:    Billed per medicaid guidelines    therapeutic exercises to develop strength, endurance, ROM, flexibility, posture, and core  stabilization for 25 minutes including:  Elliptical x 10 minutes  SAQ 2x10, 3# (5 sec eccentric lower)-NP  Standing hip extensions 3x10 Matrix-NP  Standing hio abduction 3x10 Matrix-NP  Re-assessment    manual therapy techniques: Joint mobilizations were applied to the: L knee for 10 minutes, including:  Patellar and fat pad mobilizations   KT taping for neutral patellar alignment    neuromuscular re-education activities to improve: Balance, Coordination, Kinesthetic, Sense, Proprioception, and Posture for 35 minutes. The following activities were included:  Quad set + SLR 2x10, 4# (5 sec eccentric lower)-NP  S/L hip abduction 2x10, 4# (cues for proper sidelying position-NP  SL bridging 2x10, 3 sec hold   Standing firehydrants c GTB 20x  Forward step downs 30x BLE  2 to 1 quad eccentrics 2x10, 15#-NP  Sit to stand from chair with 20# medicine ball to encourage anterior weight shift 2x10-NP  Step ups (12 inch) 2x10  Lateral step tap (4 inch) 2x10  Wall sits 20 sec x 3 (to tolerance)-NP  Skiiers lands from 4in box c BUE support 3x10      Patient Education and Home Exercises     Home Exercises Provided and Patient Education Provided     Education provided:   - HEP compliance     Written Home Exercises Provided: Patient instructed to cont prior HEP. Exercises were reviewed and Abigail was able to demonstrate them prior to the end of the session.  Abigail demonstrated good  understanding of the education provided. See EMR under Patient Instructions for exercises provided during therapy sessions    ASSESSMENT     Re-assessment performed today. Pt with slight improvement in pain and strength as a result of current POC, but has not been able to attend many visits due to being [place]. Although she presents with these improvements, she continues to experience increased pain and episodes of instability that limit her everyday function as a . Due to patient reporting episodes of buckling, performed  test-retest of forward step down with kinesiotaping during trial 2 for patellar stability. Pt with slight improvements following taping with this activity, but presented with lotion on her legs, which resulted in the tape to not adhere after 2-3 reps. At this time, pt presents with multiple S/S consistent with a meniscal tear and would benefit from receiving a MRI to assess soft tissue integrity. Due to these remaining deficits, pt will benefit an extension of current POC to 2x per week for 6 additional weeks to improve mobility, control pain, and restore overall function.      Abigail Is progressing well towards her goals.   Pt prognosis is Good.     Pt will continue to benefit from skilled outpatient physical therapy to address the deficits listed in the problem list box on initial evaluation, provide pt/family education and to maximize pt's level of independence in the home and community environment.     Pt's spiritual, cultural and educational needs considered and pt agreeable to plan of care and goals.     Anticipated barriers to physical therapy: none    Goals: Short Term Goals: 5 weeks   Patient to report worst pain 2/10 when rising from sitting-NEARING  Patient to improve B hip extension strength to 5/5-NEARING  Patient to perform forward step down from 6in box without trunk lean-MET  Patient to improve FOTO by 10%-IN PROGRESS     Long Term Goals: 10 weeks   Patient to report worst pain 0/10 when rising from sitting   Patient to perform forward step down from 6in box without trunk lean or knee vagus  Patient to improve FOTO by 20%  Patient to experience no pain to palpation of L patellar tenson or lat meniscus    PLAN     Re-assess next visit    Continue current POC with emphasis on decreasing knee pain while improving quad and hip strength.     Rajiv Zarate, PT

## 2023-09-25 ENCOUNTER — CLINICAL SUPPORT (OUTPATIENT)
Dept: REHABILITATION | Facility: HOSPITAL | Age: 58
End: 2023-09-25
Payer: MEDICAID

## 2023-09-25 DIAGNOSIS — M25.562 CHRONIC PAIN OF LEFT KNEE: Primary | ICD-10-CM

## 2023-09-25 DIAGNOSIS — G89.29 CHRONIC PAIN OF LEFT KNEE: Primary | ICD-10-CM

## 2023-09-25 DIAGNOSIS — R29.898 WEAKNESS OF BOTH LOWER EXTREMITIES: ICD-10-CM

## 2023-09-25 PROCEDURE — 97110 THERAPEUTIC EXERCISES: CPT | Mod: PO

## 2023-09-25 NOTE — PROGRESS NOTES
[facility]   Physical Therapy Re-assessment    Name: Abigail Smith  Clinic Number: [id number]    Therapy Diagnosis:   Encounter Diagnoses   Name Primary?    Chronic pain of left knee Yes    Weakness of both lower extremities      Physician: Paulino Hope MD    Visit Date: 9/25/2023  Physician Orders: PT Eval and Treat   Medical Diagnosis from Referral: M25.562,G89.29 (ICD-10-CM) - Chronic pain of left knee  Evaluation Date: 7/12/2023  Authorization Period Expiration: 7/9/2024  Plan of Care Expiration: 9/20/2023  Progress Note Due: 10/18/2023  Visit # / Visits authorized: 4/ 20  FOTO: 1/3    PTA Visit #: 1/5     Precautions: Standard     Time In: 1300 AM  Time Out: 1400 AM  Total Billable Time: 60 minutes     SUBJECTIVE     Pt reports: Feeling jet-lagged today due to returning from [place] yesterday. She states that she notices increased swelling due to the long pain ride and experienced episodes of buckling during her trip, especially when climbing.    She was compliant with home exercise program.  Response to previous treatment: no adverse effect   Functional change: too soon to determine     Pain: 3/10  Location: left knee     OBJECTIVE     Objective Measures updated at progress report unless specified.     Range of Motion:   Knee Left   Active 0-145      Lower Extremity Strength  Right LE   Left LE     Knee extension: 5/5 Knee extension: 5/5   Knee flexion: 5/5 Knee flexion: 5/5   Hip flexion: 5/5 Hip flexion: 5/5   Hip extension:  4+/5 Hip extension: 4+/5   Hip abduction: 5/5 Hip abduction: 5/5          Intake Outcome Measure for FOTO Knee Survey     Therapist reviewed FOTO scores for Abigail Smith on 7/12/2023.   FOTO documents entered into [employer] - see Media section.     Intake Score: 60%           Treatment     Abigail received the treatments listed below:    Billed per medicaid guidelines    therapeutic exercises to develop strength, endurance, ROM, flexibility, posture, and core  stabilization for 25 minutes including:  Elliptical x 10 minutes  SAQ 2x10, 3# (5 sec eccentric lower)-NP  Standing hip extensions 3x10 Matrix-NP  Standing hio abduction 3x10 Matrix-NP  Re-assessment    manual therapy techniques: Joint mobilizations were applied to the: L knee for 10 minutes, including:  Patellar and fat pad mobilizations   KT taping for neutral patellar alignment    neuromuscular re-education activities to improve: Balance, Coordination, Kinesthetic, Sense, Proprioception, and Posture for 35 minutes. The following activities were included:  Quad set + SLR 2x10, 4# (5 sec eccentric lower)-NP  S/L hip abduction 2x10, 4# (cues for proper sidelying position-NP  SL bridging 2x10, 3 sec hold   Standing firehydrants c GTB 20x  Forward step downs 30x BLE  2 to 1 quad eccentrics 2x10, 15#-NP  Sit to stand from chair with 20# medicine ball to encourage anterior weight shift 2x10-NP  Step ups (12 inch) 2x10  Lateral step tap (4 inch) 2x10  Wall sits 20 sec x 3 (to tolerance)-NP  Skiiers lands from 4in box c BUE support 3x10      Patient Education and Home Exercises     Home Exercises Provided and Patient Education Provided     Education provided:   - HEP compliance     Written Home Exercises Provided: Patient instructed to cont prior HEP. Exercises were reviewed and Abigail was able to demonstrate them prior to the end of the session.  Abigail demonstrated good  understanding of the education provided. See EMR under Patient Instructions for exercises provided during therapy sessions    ASSESSMENT     Re-assessment performed today. Pt with slight improvement in pain and strength as a result of current POC, but has not been able to attend many visits due [place]. Although she presents with these improvements, she continues to experience increased pain and episodes of instability that limit her everyday function as a . Due to patient reporting episodes of buckling, performed  test-retest of forward step down with kinesiotaping during trial 2 for patellar stability. Pt with slight improvements following taping with this activity, but presented with lotion on her legs, which resulted in the tape to not adhere after 2-3 reps. At this time, pt presents with multiple S/S consistent with a meniscal tear and would benefit from receiving a MRI to assess soft tissue integrity. Due to these remaining deficits, pt will benefit an extension of current POC to 2x per week for 6 additional weeks to improve mobility, control pain, and restore overall function.      Abigail Is progressing well towards her goals.   Pt prognosis is Good.     Pt will continue to benefit from skilled outpatient physical therapy to address the deficits listed in the problem list box on initial evaluation, provide pt/family education and to maximize pt's level of independence in the home and community environment.     Pt's spiritual, cultural and educational needs considered and pt agreeable to plan of care and goals.     Anticipated barriers to physical therapy: none    Goals: Short Term Goals: 5 weeks   Patient to report worst pain 2/10 when rising from sitting-NEARING  Patient to improve B hip extension strength to 5/5-NEARING  Patient to perform forward step down from 6in box without trunk lean-MET  Patient to improve FOTO by 10%-IN PROGRESS     Long Term Goals: 10 weeks   Patient to report worst pain 0/10 when rising from sitting   Patient to perform forward step down from 6in box without trunk lean or knee vagus  Patient to improve FOTO by 20%  Patient to experience no pain to palpation of L patellar tenson or lat meniscus    PLAN     Re-assess next visit    Continue current POC with emphasis on decreasing knee pain while improving quad and hip strength.     Rajiv Zarate, PT

## 2023-09-25 NOTE — PROGRESS NOTES
[facility]   Physical Therapy Treatment Note    Name: Abigail Smith  Clinic Number: [id number]    Therapy Diagnosis:   Encounter Diagnoses   Name Primary?    Chronic pain of left knee Yes    Weakness of both lower extremities      Physician: Paulino Hope MD    Visit Date: 9/25/2023  Physician Orders: PT Eval and Treat   Medical Diagnosis from Referral: M25.562,G89.29 (ICD-10-CM) - Chronic pain of left knee  Evaluation Date: 7/12/2023  Authorization Period Expiration: 7/9/2024  Plan of Care Expiration: 9/20/2023  Progress Note Due: 10/18/2023  Visit # / Visits authorized: 4/ 20  FOTO: 1/3    PTA Visit #: 1/5     Precautions: Standard     Time In: 1300   Time Out: 1400   Total Billable Time: 60 minutes     SUBJECTIVE     Pt reports: knee pain with ascending/descending stairs and stepping off escalator.    She was compliant with home exercise program.  Response to previous treatment: no adverse effect   Functional change: too soon to determine     Pain: 3/10  Location: left knee     OBJECTIVE     Objective Measures updated at progress report unless specified.     Range of Motion:   Knee Left   Active 0-145      Lower Extremity Strength  Right LE   Left LE     Knee extension: 5/5 Knee extension: 5/5   Knee flexion: 5/5 Knee flexion: 5/5   Hip flexion: 5/5 Hip flexion: 5/5   Hip extension:  4+/5 Hip extension: 4+/5   Hip abduction: 5/5 Hip abduction: 5/5          Intake Outcome Measure for FOTO Knee Survey     Therapist reviewed FOTO scores for Abigail Smith on 7/12/2023.   FOTO documents entered into [employer] - see Media section.     Intake Score: 60%           Treatment     Abigail received the treatments listed below:    Billed per medicaid guidelines    therapeutic exercises to develop strength, endurance, ROM, flexibility, posture, and core stabilization for 25 minutes including:  Elliptical x 10 minutes  SAQ 2x10, 3# (5 sec eccentric lower)-NP  Standing hip extensions 3x10  Matrix-NP  Standing hio abduction 3x10 Matrix-NP  Re-assessment    manual therapy techniques: Joint mobilizations were applied to the: L knee for 10 minutes, including:  Patellar and fat pad mobilizations   KT taping for neutral patellar alignment    neuromuscular re-education activities to improve: Balance, Coordination, Kinesthetic, Sense, Proprioception, and Posture for 35 minutes. The following activities were included:  Standing clams 5x30  2 to 1 quad eccentrics 2x10, leg press   Sit to stand from chair with 20# medicine ball to encourage anterior weight shift 2x10-NP  Step ups (12 inch) 2x10  Lateral step tap (4 inch) 3x8  Wall sits 20 sec x 3 (to tolerance)-NP  Skiiers lands from 4in box c BUE support 3x10      Patient Education and Home Exercises     Home Exercises Provided and Patient Education Provided     Education provided:   - HEP compliance     Written Home Exercises Provided: Patient instructed to cont prior HEP. Exercises were reviewed and Abigail was able to demonstrate them prior to the end of the session.  Abigail demonstrated good  understanding of the education provided. See EMR under Patient Instructions for exercises provided during therapy sessions    ASSESSMENT     Patient with palpable clicking and pain during STS to 18 inch bench that is unchanged with isometrics. S/sx consistent with possible left knee plica syndrome. Deficits noted in bilateral hip strength and single limb control.    Abigail Is progressing well towards her goals.   Pt prognosis is Good.     Pt will continue to benefit from skilled outpatient physical therapy to address the deficits listed in the problem list box on initial evaluation, provide pt/family education and to maximize pt's level of independence in the home and community environment.     Pt's spiritual, cultural and educational needs considered and pt agreeable to plan of care and goals.     Anticipated barriers to physical therapy: none    Goals: Short Term Goals: 5  weeks   Patient to report worst pain 2/10 when rising from sitting-NEARING  Patient to improve B hip extension strength to 5/5-NEARING  Patient to perform forward step down from 6in box without trunk lean-MET  Patient to improve FOTO by 10%-IN PROGRESS     Long Term Goals: 10 weeks   Patient to report worst pain 0/10 when rising from sitting   Patient to perform forward step down from 6in box without trunk lean or knee vagus  Patient to improve FOTO by 20%  Patient to experience no pain to palpation of L patellar tenson or lat meniscus    PLAN     Re-assess next visit    Continue current POC with emphasis on decreasing knee pain while improving quad and hip strength.     Maldonado Ahuja, PT

## 2023-09-27 ENCOUNTER — CLINICAL SUPPORT (OUTPATIENT)
Dept: REHABILITATION | Facility: HOSPITAL | Age: 58
End: 2023-09-27
Payer: MEDICAID

## 2023-09-27 DIAGNOSIS — M25.562 CHRONIC PAIN OF LEFT KNEE: Primary | ICD-10-CM

## 2023-09-27 DIAGNOSIS — R29.898 WEAKNESS OF BOTH LOWER EXTREMITIES: ICD-10-CM

## 2023-09-27 DIAGNOSIS — G89.29 CHRONIC PAIN OF LEFT KNEE: Primary | ICD-10-CM

## 2023-09-27 PROCEDURE — 97110 THERAPEUTIC EXERCISES: CPT | Mod: PO

## 2023-09-27 NOTE — PROGRESS NOTES
[facility]   Physical Therapy Treatment Note    Name: Abigail Smith  Clinic Number: [id number]    Therapy Diagnosis:   Encounter Diagnoses   Name Primary?    Chronic pain of left knee Yes    Weakness of both lower extremities      Physician: Paulino Hope MD    Visit Date: 9/27/2023  Physician Orders: PT Eval and Treat   Medical Diagnosis from Referral: M25.562,G89.29 (ICD-10-CM) - Chronic pain of left knee  Evaluation Date: 7/12/2023  Authorization Period Expiration: 7/9/2024  Plan of Care Expiration: 10/30/2023  Progress Note Due: 10/18/2023  Visit # / Visits authorized: 5/ 20  FOTO: 1/3    PTA Visit #: 1/5     Precautions: Standard     Time In: 1100   Time Out: 1157   Total Billable Time: 57 minutes     SUBJECTIVE     Pt reports: knee pain when extending her knee from a bent position but no new complaints otherwise    She was compliant with home exercise program.  Response to previous treatment: no adverse effect   Functional change: too soon to determine     Pain: 3/10  Location: left knee     OBJECTIVE     Objective Measures updated at progress report unless specified.     Range of Motion:   Knee Left   Active 0-145      Lower Extremity Strength  Right LE   Left LE     Knee extension: 5/5 Knee extension: 5/5   Knee flexion: 5/5 Knee flexion: 5/5   Hip flexion: 5/5 Hip flexion: 5/5   Hip extension:  4+/5 Hip extension: 4+/5   Hip abduction: 5/5 Hip abduction: 5/5          Intake Outcome Measure for FOTO Knee Survey     Therapist reviewed FOTO scores for Abigail Smith on 7/12/2023.   FOTO documents entered into [employer] - see Media section.     Intake Score: 60%           Treatment     Abigail received the treatments listed below:    Billed per medicaid guidelines    therapeutic exercises to develop strength, endurance, ROM, flexibility, posture, and core stabilization for 10 minutes including:  Elliptical x 10 minutes  SAQ 2x10, 3# (5 sec eccentric lower)-NP  Standing hip extensions 3x10  Matrix-NP  Standing hio abduction 3x10 Matrix-NP  Re-assessment    manual therapy techniques: Joint mobilizations were applied to the: L knee for 0 minutes, including:  Patellar and fat pad mobilizations   KT taping for neutral patellar alignment    neuromuscular re-education activities to improve: Balance, Coordination, Kinesthetic, Sense, Proprioception, and Posture for 45 minutes. The following activities were included:  Standing clams 5x30  2 to 1 quad eccentrics 2x10, leg press -NP  Sit to stand from chair with 20# medicine ball to encourage anterior weight shift 2x10-NP  Step ups (12 inch) 2x10  Lateral step tap (4 inch) 3x8  Wall sits 20 sec x 3 (to tolerance)-NP  Skiiers lands from 4in box c BUE support 3x10  SL RDL cone taps c left upper extremity support  20# sled pushes x6 laps (3 slow and 3 for power)  Bosu step>SLS c 1 finger support 20x      Patient Education and Home Exercises     Home Exercises Provided and Patient Education Provided     Education provided:   - HEP compliance     Written Home Exercises Provided: Patient instructed to cont prior HEP. Exercises were reviewed and Abigail was able to demonstrate them prior to the end of the session.  Abigail demonstrated good  understanding of the education provided. See EMR under Patient Instructions for exercises provided during therapy sessions    ASSESSMENT     Progressed today's interventions to incorporate weighted sled pushes for functional knee extension strength within pt's pain-free limits with good tolerance today but increased fatigue. Also introduced bosu balance interventions to promote knee stabilization training with good tolerance but numerous balance excursions noted, despite upper extremity support. Will continue to progress interventions to tolerance going forward to improve mobility and promote safe return to ski instruction.    Abigail Is progressing well towards her goals.   Pt prognosis is Good.     Pt will continue to benefit from skilled  outpatient physical therapy to address the deficits listed in the problem list box on initial evaluation, provide pt/family education and to maximize pt's level of independence in the home and community environment.     Pt's spiritual, cultural and educational needs considered and pt agreeable to plan of care and goals.     Anticipated barriers to physical therapy: none    Goals: Short Term Goals: 5 weeks   Patient to report worst pain 2/10 when rising from sitting-NEARING  Patient to improve B hip extension strength to 5/5-NEARING  Patient to perform forward step down from 6in box without trunk lean-MET  Patient to improve FOTO by 10%-IN PROGRESS     Long Term Goals: 10 weeks   Patient to report worst pain 0/10 when rising from sitting   Patient to perform forward step down from 6in box without trunk lean or knee vagus  Patient to improve FOTO by 20%  Patient to experience no pain to palpation of L patellar tenson or lat meniscus    PLAN         Continue current POC with emphasis on decreasing knee pain while improving quad and hip strength.     Rajiv Zarate, PT

## 2023-10-02 ENCOUNTER — CLINICAL SUPPORT (OUTPATIENT)
Dept: REHABILITATION | Facility: HOSPITAL | Age: 58
End: 2023-10-02
Payer: MEDICAID

## 2023-10-02 DIAGNOSIS — G89.29 CHRONIC PAIN OF LEFT KNEE: Primary | ICD-10-CM

## 2023-10-02 DIAGNOSIS — R29.898 WEAKNESS OF BOTH LOWER EXTREMITIES: ICD-10-CM

## 2023-10-02 DIAGNOSIS — M25.562 CHRONIC PAIN OF LEFT KNEE: Primary | ICD-10-CM

## 2023-10-02 PROCEDURE — 97110 THERAPEUTIC EXERCISES: CPT | Mod: PO

## 2023-10-02 NOTE — PROGRESS NOTES
[facility]   Physical Therapy Treatment Note    Name: Abigail Smith  Clinic Number: [id number]    Therapy Diagnosis:   Encounter Diagnoses   Name Primary?    Chronic pain of left knee Yes    Weakness of both lower extremities      Physician: Paulino Hope MD    Visit Date: 10/2/2023  Physician Orders: PT Eval and Treat   Medical Diagnosis from Referral: M25.562,G89.29 (ICD-10-CM) - Chronic pain of left knee  Evaluation Date: 7/12/2023  Authorization Period Expiration: 7/9/2024  Plan of Care Expiration: 10/30/2023  Progress Note Due: 10/18/2023  Visit # / Visits authorized: 5/ 20  FOTO: 1/3    PTA Visit #: 1/5     Precautions: Standard     Time In: 1300  Time Out: 1400  Total Billable Time: 30 minutes     SUBJECTIVE     Pt reports: increased knee pain today and contributed it to being more active this weekend than usual at a festival    She was compliant with home exercise program.  Response to previous treatment: no adverse effect   Functional change: too soon to determine     Pain: 5/10  Location: left knee     OBJECTIVE     Objective Measures updated at progress report unless specified.     Range of Motion:   Knee Left   Active 0-145      Lower Extremity Strength  Right LE   Left LE     Knee extension: 5/5 Knee extension: 5/5   Knee flexion: 5/5 Knee flexion: 5/5   Hip flexion: 5/5 Hip flexion: 5/5   Hip extension:  4+/5 Hip extension: 4+/5   Hip abduction: 5/5 Hip abduction: 5/5          Intake Outcome Measure for FOTO Knee Survey     Therapist reviewed FOTO scores for Abigail Smith on 7/12/2023.   FOTO documents entered into EP[employer] - see Media section.     Intake Score: 60%           Treatment     Abigail received the treatments listed below:    Billed per medicaid guidelines    30 mins spent c tech    therapeutic exercises to develop strength, endurance, ROM, flexibility, posture, and core stabilization for 10 minutes including:  Elliptical x 10 minutes  SAQ 2x10, 3# (5 sec eccentric  lower)-NP  Standing hip extensions 3x10 Matrix-NP  Standing hio abduction 3x10 Matrix-NP  Re-assessment    manual therapy techniques: Joint mobilizations were applied to the: L knee for 0 minutes, including:  Patellar and fat pad mobilizations   KT taping for neutral patellar alignment    neuromuscular re-education activities to improve: Balance, Coordination, Kinesthetic, Sense, Proprioception, and Posture for 50 minutes. The following activities were included:  Standing clams 5x30  2 to 1 quad eccentrics 2x10, leg press -NP  Sit to stand from chair with 20# medicine ball to encourage anterior weight shift 2x10-NP  Step ups (12 inch) 2x10  Lateral step tap (4 inch) 3x8  Wall sits 20 sec x 3 (to tolerance)-NP  Skiiers lands from 4in box c BUE support 3x10  SL RDL cone taps c left upper extremity support-NP  20# sled pushes x6 laps (3 slow and 3 for power)-NP  Bosu step>SLS c 1 finger support 20x  SL sit to stand from 20 in box 2x10 BLE  KT taping prior to exercise to B knees for stability      Patient Education and Home Exercises     Home Exercises Provided and Patient Education Provided     Education provided:   - HEP compliance     Written Home Exercises Provided: Patient instructed to cont prior HEP. Exercises were reviewed and Abigail was able to demonstrate them prior to the end of the session.  Abigail demonstrated good  understanding of the education provided. See EMR under Patient Instructions for exercises provided during therapy sessions    ASSESSMENT     Due to pt reporting increased knee pain, incorporated KT taping to her B knees to improve B knee stability during exercise with good tolerance but no significant improvements notes. Pt with slight improvement in overall pain by the end of today's session, but continues to demo mechanical symptoms with reproduction of pain during rising from a bent position. Will continue to altert and progress exercises to tolerance going forward to improve overall  function.    Abigail Is progressing well towards her goals.   Pt prognosis is Good.     Pt will continue to benefit from skilled outpatient physical therapy to address the deficits listed in the problem list box on initial evaluation, provide pt/family education and to maximize pt's level of independence in the home and community environment.     Pt's spiritual, cultural and educational needs considered and pt agreeable to plan of care and goals.     Anticipated barriers to physical therapy: none    Goals: Short Term Goals: 5 weeks   Patient to report worst pain 2/10 when rising from sitting-NEARING  Patient to improve B hip extension strength to 5/5-NEARING  Patient to perform forward step down from 6in box without trunk lean-MET  Patient to improve FOTO by 10%-IN PROGRESS     Long Term Goals: 10 weeks   Patient to report worst pain 0/10 when rising from sitting   Patient to perform forward step down from 6in box without trunk lean or knee vagus  Patient to improve FOTO by 20%  Patient to experience no pain to palpation of L patellar tenson or lat meniscus    PLAN         Continue current POC with emphasis on decreasing knee pain while improving quad and hip strength.     Rajiv Zarate, PT

## 2023-10-09 ENCOUNTER — CLINICAL SUPPORT (OUTPATIENT)
Dept: REHABILITATION | Facility: HOSPITAL | Age: 58
End: 2023-10-09
Payer: MEDICAID

## 2023-10-09 DIAGNOSIS — R29.898 WEAKNESS OF BOTH LOWER EXTREMITIES: ICD-10-CM

## 2023-10-09 DIAGNOSIS — G89.29 CHRONIC PAIN OF LEFT KNEE: Primary | ICD-10-CM

## 2023-10-09 DIAGNOSIS — M25.562 CHRONIC PAIN OF LEFT KNEE: Primary | ICD-10-CM

## 2023-10-09 PROCEDURE — 97110 THERAPEUTIC EXERCISES: CPT | Mod: PO

## 2023-10-09 NOTE — PROGRESS NOTES
[facility]   Physical Therapy Treatment Note    Name: Abigail Smith  Clinic Number: [id number]    Therapy Diagnosis:   Encounter Diagnoses   Name Primary?    Chronic pain of left knee Yes    Weakness of both lower extremities      Physician: Paulino Hope MD    Visit Date: 10/9/2023  Physician Orders: PT Eval and Treat   Medical Diagnosis from Referral: M25.562,G89.29 (ICD-10-CM) - Chronic pain of left knee  Evaluation Date: 7/12/2023  Authorization Period Expiration: 7/9/2024  Plan of Care Expiration: 10/30/2023  Progress Note Due: 10/18/2023  Visit # / Visits authorized: 7/ 20  FOTO: 1/3    PTA Visit #: 1/5     Precautions: Standard     Time In: 1300  Time Out: 1355  Total Billable Time: 30 minutes     SUBJECTIVE     Pt reports: no changes since previous session and is w/o complaints    She was compliant with home exercise program.  Response to previous treatment: no adverse effect   Functional change: too soon to determine     Pain: 5/10  Location: left knee     OBJECTIVE     Objective Measures updated at progress report unless specified.     Range of Motion:   Knee Left   Active 0-145      Lower Extremity Strength  Right LE   Left LE     Knee extension: 5/5 Knee extension: 5/5   Knee flexion: 5/5 Knee flexion: 5/5   Hip flexion: 5/5 Hip flexion: 5/5   Hip extension:  4+/5 Hip extension: 4+/5   Hip abduction: 5/5 Hip abduction: 5/5          Intake Outcome Measure for FOTO Knee Survey     Therapist reviewed FOTO scores for Abigail Smith on 7/12/2023.   FOTO documents entered into [employer] - see Media section.     Intake Score: 60%           Treatment     Abigail received the treatments listed below:    Billed per medicaid guidelines    30 mins spent c tech    therapeutic exercises to develop strength, endurance, ROM, flexibility, posture, and core stabilization for 10 minutes including:  Elliptical x 10 minutes  SAQ 2x10, 3# (5 sec eccentric lower)-NP  Standing hip extensions 3x10  Matrix-NP  Standing hio abduction 3x10 Matrix-NP  Re-assessment-NP    manual therapy techniques: Joint mobilizations were applied to the: L knee for 0 minutes, including:  Patellar and fat pad mobilizations   KT taping for neutral patellar alignment    neuromuscular re-education activities to improve: Balance, Coordination, Kinesthetic, Sense, Proprioception, and Posture for 45 minutes. The following activities were included:  Standing clams 5x30  2 to 1 quad eccentrics 2x10, leg press -NP  Sit to stand from chair with 20# medicine ball to encourage anterior weight shift 2x10-NP  Step ups (12 inch) 2x10  Lateral step tap (4 inch) 3x8  Wall sits 20 sec x 3 (to tolerance)-NP  Skiiers lands from 4in box c BUE support 3x10  SL RDL cone taps c left upper extremity support  20# sled pushes x6 laps (3 slow and 3 for power)-NP  Bosu step>SLS c 1 finger support 20x  SL sit to stand from 20 in box 2x10 BLE  KT taping prior to exercise to B knees for stability      Patient Education and Home Exercises     Home Exercises Provided and Patient Education Provided     Education provided:   - HEP compliance     Written Home Exercises Provided: Patient instructed to cont prior HEP. Exercises were reviewed and Abigail was able to demonstrate them prior to the end of the session.  Abigail demonstrated good  understanding of the education provided. See EMR under Patient Instructions for exercises provided during therapy sessions    ASSESSMENT     Continues to incorporate BLE strengthening and stability exercises with good tolerance. Pt with continued mechanical symptoms today, despite KT taping performed prior to initiation of today's treatment session. Pt noted to tolerate functional multi-joint strengthening activities better today compared to previous sessions and will continue to be progress per tolerance and pain intensity going forward.     Abigail Is progressing well towards her goals.   Pt prognosis is Good.     Pt will continue to  benefit from skilled outpatient physical therapy to address the deficits listed in the problem list box on initial evaluation, provide pt/family education and to maximize pt's level of independence in the home and community environment.     Pt's spiritual, cultural and educational needs considered and pt agreeable to plan of care and goals.     Anticipated barriers to physical therapy: none    Goals: Short Term Goals: 5 weeks   Patient to report worst pain 2/10 when rising from sitting-NEARING  Patient to improve B hip extension strength to 5/5-NEARING  Patient to perform forward step down from 6in box without trunk lean-MET  Patient to improve FOTO by 10%-IN PROGRESS     Long Term Goals: 10 weeks   Patient to report worst pain 0/10 when rising from sitting   Patient to perform forward step down from 6in box without trunk lean or knee vagus  Patient to improve FOTO by 20%  Patient to experience no pain to palpation of L patellar tenson or lat meniscus    PLAN         Continue current POC with emphasis on decreasing knee pain while improving quad and hip strength.     Rajiv Zarate, PT

## 2023-10-16 ENCOUNTER — CLINICAL SUPPORT (OUTPATIENT)
Dept: REHABILITATION | Facility: HOSPITAL | Age: 58
End: 2023-10-16
Payer: MEDICAID

## 2023-10-16 DIAGNOSIS — R29.898 WEAKNESS OF BOTH LOWER EXTREMITIES: ICD-10-CM

## 2023-10-16 DIAGNOSIS — M25.562 CHRONIC PAIN OF LEFT KNEE: Primary | ICD-10-CM

## 2023-10-16 DIAGNOSIS — G89.29 CHRONIC PAIN OF LEFT KNEE: Primary | ICD-10-CM

## 2023-10-16 PROCEDURE — 97110 THERAPEUTIC EXERCISES: CPT | Mod: PO

## 2023-10-16 NOTE — PROGRESS NOTES
[facility]   Physical Therapy Discharge Note    Name: Abigail Smith  Clinic Number: [id number]    Therapy Diagnosis:   Encounter Diagnoses   Name Primary?    Chronic pain of left knee Yes    Weakness of both lower extremities      Physician: Paulino Hope MD    Visit Date: 10/16/2023  Physician Orders: PT Eval and Treat   Medical Diagnosis from Referral: M25.562,G89.29 (ICD-10-CM) - Chronic pain of left knee  Evaluation Date: 7/12/2023  Authorization Period Expiration: 7/9/2024  Plan of Care Expiration: 10/30/2023  Progress Note Due: 10/18/2023  Visit # / Visits authorized: 8/ 20  FOTO: 1/3    PTA Visit #: 1/5     Precautions: Standard     Time In: 1500  Time Out: 1600  Total Billable Time: 30 minutes     SUBJECTIVE     Pt reports: he continues to experience pain in her R knee that hasn't gotten better. She plans to follow back up with her MD ASAP and would like to discontinue PT in the meantime until she can follow back up with her MD.     She was compliant with home exercise program.  Response to previous treatment: no adverse effect   Functional change: too soon to determine     Pain: 5/10  Location: left knee     OBJECTIVE     Objective Measures updated at progress report unless specified.     Range of Motion:   Knee Left   Active 0-145      Lower Extremity Strength  Right LE   Left LE     Knee extension: 5/5 Knee extension: 5/5   Knee flexion: 5/5 Knee flexion: 5/5   Hip flexion: 5/5 Hip flexion: 5/5   Hip extension:  5/5 Hip extension: 5/5   Hip abduction: 5/5 Hip abduction: 5/5          Intake Outcome Measure for FOTO Knee Survey     Therapist reviewed FOTO scores for Abigail Smith on 7/12/2023.   FOTO documents entered into [employer] - see Media section.     Intake Score: 39%           Treatment     Abigail received the treatments listed below:    Billed per medicaid guidelines    30 mins spent c tech    therapeutic exercises to develop strength, endurance, ROM, flexibility, posture,  and core stabilization for 20 minutes including:  Elliptical x 10 minutes  SAQ 2x10, 3# (5 sec eccentric lower)-NP  Standing hip extensions 3x10 Matrix-NP  Standing hio abduction 3x10 Matrix-NP  Re-assessment    manual therapy techniques: Joint mobilizations were applied to the: L knee for 0 minutes, including:  Patellar and fat pad mobilizations   KT taping for neutral patellar alignment    neuromuscular re-education activities to improve: Balance, Coordination, Kinesthetic, Sense, Proprioception, and Posture for 40 minutes. The following activities were included:  Standing clams 5x30  2 to 1 quad eccentrics 2x10, leg press -NP  Sit to stand from chair with 20# medicine ball to encourage anterior weight shift 2x10-NP  Step ups (12 inch) 2x10  Lateral step tap (4 inch) 3x8  Wall sits 30 sec x 5 c 3# dowel overhead  Skiiers lands from 4in box c BUE support 3x10  SL RDL cone taps c left upper extremity support  20# sled pushes x6 laps (3 slow and 3 for power)-NP  Bosu step>SLS c 1 finger support 20x  SL sit to stand from 20 in box 2x10 BLE  KT taping prior to exercise to B knees for stability-NP      Patient Education and Home Exercises     Home Exercises Provided and Patient Education Provided     Education provided:   - HEP compliance     Written Home Exercises Provided: Patient instructed to cont prior HEP. Exercises were reviewed and Abigail was able to demonstrate them prior to the end of the session.  Abigail demonstrated good  understanding of the education provided. See EMR under Patient Instructions for exercises provided during therapy sessions    ASSESSMENT     Re-assessment performed today.Pt continued to present with R knee pain but with improvements in range of motion and strength as a result of current POC. Pt no longer presents with deficits in strength, but continues to present with non-improving pain in her R knee, suggestive of soft tissue pathology. Due to this, pt was educated on and agrees to  discharging from PT today and following back up with her MD at her earliest convenience. Due to pt meeting all strength and range of motion goals bu no improvements in knee pain, pt will discharge from skilled PT services and follow back up with MD ASAP for another opinion and/or possible further diagnostic work-up.    Abigail Is progressing well towards her goals.   Pt prognosis is Good.     Pt will continue to benefit from skilled outpatient physical therapy to address the deficits listed in the problem list box on initial evaluation, provide pt/family education and to maximize pt's level of independence in the home and community environment.     Pt's spiritual, cultural and educational needs considered and pt agreeable to plan of care and goals.     Anticipated barriers to physical therapy: none    Goals: Short Term Goals: 5 weeks   Patient to report worst pain 2/10 when rising from sitting-NEARING  Patient to improve B hip extension strength to 5/5-MET  Patient to perform forward step down from 6in box without trunk lean-MET  Patient to improve FOTO by 10%-MET     Long Term Goals: 10 weeks   Patient to report worst pain 0/10 when rising from sitting   Patient to perform forward step down from 6in box without trunk lean or knee vagus  Patient to improve FOTO by 20%  Patient to experience no pain to palpation of L patellar tenson or lat meniscus    PLAN         Continue current POC with emphasis on decreasing knee pain while improving quad and hip strength.     Rajiv Zarate, PT

## 2023-11-29 DIAGNOSIS — M25.562 ACUTE PAIN OF LEFT KNEE: Primary | ICD-10-CM

## 2023-11-30 ENCOUNTER — OFFICE VISIT (OUTPATIENT)
Dept: ORTHOPEDICS | Facility: CLINIC | Age: 58
End: 2023-11-30
Payer: MEDICAID

## 2023-11-30 ENCOUNTER — HOSPITAL ENCOUNTER (OUTPATIENT)
Dept: RADIOLOGY | Facility: HOSPITAL | Age: 58
Discharge: HOME | End: 2023-11-30
Attending: ORTHOPAEDIC SURGERY
Payer: MEDICAID

## 2023-11-30 VITALS — BODY MASS INDEX: 28.12 KG/M2 | HEIGHT: 66 IN | WEIGHT: 175 LBS

## 2023-11-30 DIAGNOSIS — M25.562 ACUTE PAIN OF LEFT KNEE: ICD-10-CM

## 2023-11-30 DIAGNOSIS — M25.562 ACUTE PAIN OF LEFT KNEE: Primary | ICD-10-CM

## 2023-11-30 DIAGNOSIS — S83.92XA SPRAIN OF LEFT KNEE, UNSPECIFIED LIGAMENT, INITIAL ENCOUNTER: ICD-10-CM

## 2023-11-30 PROCEDURE — 99203 PR OFFICE/OUTPT VISIT, NEW, LEVL III, 30-44 MIN: ICD-10-PCS | Mod: S$PBB,,, | Performed by: ORTHOPAEDIC SURGERY

## 2023-11-30 PROCEDURE — 1159F MED LIST DOCD IN RCRD: CPT | Mod: CPTII,,, | Performed by: ORTHOPAEDIC SURGERY

## 2023-11-30 PROCEDURE — 3044F HG A1C LEVEL LT 7.0%: CPT | Mod: CPTII,,, | Performed by: ORTHOPAEDIC SURGERY

## 2023-11-30 PROCEDURE — 99999 PR PBB SHADOW E&M-EST. PATIENT-LVL III: ICD-10-PCS | Mod: PBBFAC,,, | Performed by: ORTHOPAEDIC SURGERY

## 2023-11-30 PROCEDURE — 73560 X-RAY EXAM OF KNEE 1 OR 2: CPT | Mod: 59,TC,PO,RT

## 2023-11-30 PROCEDURE — 73562 XR KNEE ORTHO LEFT: ICD-10-PCS | Mod: 26,LT,, | Performed by: RADIOLOGY

## 2023-11-30 PROCEDURE — 3008F BODY MASS INDEX DOCD: CPT | Mod: CPTII,,, | Performed by: ORTHOPAEDIC SURGERY

## 2023-11-30 PROCEDURE — 1159F PR MEDICATION LIST DOCUMENTED IN MEDICAL RECORD: ICD-10-PCS | Mod: CPTII,,, | Performed by: ORTHOPAEDIC SURGERY

## 2023-11-30 PROCEDURE — 3044F PR MOST RECENT HEMOGLOBIN A1C LEVEL <7.0%: ICD-10-PCS | Mod: CPTII,,, | Performed by: ORTHOPAEDIC SURGERY

## 2023-11-30 PROCEDURE — 99999 PR PBB SHADOW E&M-EST. PATIENT-LVL III: CPT | Mod: PBBFAC,,, | Performed by: ORTHOPAEDIC SURGERY

## 2023-11-30 PROCEDURE — 3008F PR BODY MASS INDEX (BMI) DOCUMENTED: ICD-10-PCS | Mod: CPTII,,, | Performed by: ORTHOPAEDIC SURGERY

## 2023-11-30 PROCEDURE — 73560 X-RAY EXAM OF KNEE 1 OR 2: CPT | Mod: 26,59,RT, | Performed by: RADIOLOGY

## 2023-11-30 PROCEDURE — 73560 XR KNEE ORTHO LEFT: ICD-10-PCS | Mod: 26,59,RT, | Performed by: RADIOLOGY

## 2023-11-30 PROCEDURE — 73562 X-RAY EXAM OF KNEE 3: CPT | Mod: 26,LT,, | Performed by: RADIOLOGY

## 2023-11-30 PROCEDURE — 99213 OFFICE O/P EST LOW 20 MIN: CPT | Mod: PBBFAC,PO | Performed by: ORTHOPAEDIC SURGERY

## 2023-11-30 PROCEDURE — 99203 OFFICE O/P NEW LOW 30 MIN: CPT | Mod: S$PBB,,, | Performed by: ORTHOPAEDIC SURGERY

## 2023-11-30 NOTE — PROGRESS NOTES
58 years old left knee pain for about 6 months time no one time traumatic event that she can recall possibly twisting mechanism.  She does work as a .  Pain is 2 on good days 8 on bad days locking catching in her knee.  Worse with standing or deep flexion somewhat relieved with rest she is tried home remedies without relief     Exam shows positive Brannon testing, also tender the IT band at the knee, good strength no instability     X-rays show relatively well-preserved joint spacing     Assessment: Left lateral knee pain, possible lateral meniscal tear versus IT band syndrome     Plan:  MRI of the knee, follow-up after that to discuss different treatment options    Patient seen as a consult

## 2023-12-14 ENCOUNTER — HOSPITAL ENCOUNTER (OUTPATIENT)
Dept: RADIOLOGY | Facility: HOSPITAL | Age: 58
Discharge: HOME | End: 2023-12-14
Attending: ORTHOPAEDIC SURGERY
Payer: MEDICAID

## 2023-12-14 DIAGNOSIS — M25.562 ACUTE PAIN OF LEFT KNEE: ICD-10-CM

## 2023-12-14 DIAGNOSIS — S83.92XA SPRAIN OF LEFT KNEE, UNSPECIFIED LIGAMENT, INITIAL ENCOUNTER: ICD-10-CM

## 2023-12-14 PROCEDURE — 73721 MRI JNT OF LWR EXTRE W/O DYE: CPT | Mod: TC,PO,LT

## 2023-12-14 PROCEDURE — 73721 MRI KNEE WITHOUT CONTRAST LEFT: ICD-10-PCS | Mod: 26,LT,, | Performed by: RADIOLOGY

## 2023-12-14 PROCEDURE — 73721 MRI JNT OF LWR EXTRE W/O DYE: CPT | Mod: 26,LT,, | Performed by: RADIOLOGY

## 2023-12-15 ENCOUNTER — OFFICE VISIT (OUTPATIENT)
Dept: ORTHOPEDICS | Facility: CLINIC | Age: 58
End: 2023-12-15
Payer: MEDICAID

## 2023-12-15 VITALS — BODY MASS INDEX: 28.12 KG/M2 | HEIGHT: 66 IN | WEIGHT: 175 LBS

## 2023-12-15 DIAGNOSIS — M25.562 ACUTE PAIN OF LEFT KNEE: Primary | ICD-10-CM

## 2023-12-15 PROCEDURE — 3044F PR MOST RECENT HEMOGLOBIN A1C LEVEL <7.0%: ICD-10-PCS | Mod: CPTII,,, | Performed by: ORTHOPAEDIC SURGERY

## 2023-12-15 PROCEDURE — 3044F HG A1C LEVEL LT 7.0%: CPT | Mod: CPTII,,, | Performed by: ORTHOPAEDIC SURGERY

## 2023-12-15 PROCEDURE — 99999 PR PBB SHADOW E&M-EST. PATIENT-LVL II: CPT | Mod: PBBFAC,,, | Performed by: ORTHOPAEDIC SURGERY

## 2023-12-15 PROCEDURE — 1159F MED LIST DOCD IN RCRD: CPT | Mod: CPTII,,, | Performed by: ORTHOPAEDIC SURGERY

## 2023-12-15 PROCEDURE — 99215 OFFICE O/P EST HI 40 MIN: CPT | Mod: 57,S$PBB,, | Performed by: ORTHOPAEDIC SURGERY

## 2023-12-15 PROCEDURE — 99212 OFFICE O/P EST SF 10 MIN: CPT | Mod: PBBFAC,PO | Performed by: ORTHOPAEDIC SURGERY

## 2023-12-15 PROCEDURE — 1159F PR MEDICATION LIST DOCUMENTED IN MEDICAL RECORD: ICD-10-PCS | Mod: CPTII,,, | Performed by: ORTHOPAEDIC SURGERY

## 2023-12-15 PROCEDURE — 99215 PR OFFICE/OUTPT VISIT, EST, LEVL V, 40-54 MIN: ICD-10-PCS | Mod: 57,S$PBB,, | Performed by: ORTHOPAEDIC SURGERY

## 2023-12-15 PROCEDURE — 99999 PR PBB SHADOW E&M-EST. PATIENT-LVL II: ICD-10-PCS | Mod: PBBFAC,,, | Performed by: ORTHOPAEDIC SURGERY

## 2023-12-15 PROCEDURE — 3008F PR BODY MASS INDEX (BMI) DOCUMENTED: ICD-10-PCS | Mod: CPTII,,, | Performed by: ORTHOPAEDIC SURGERY

## 2023-12-15 PROCEDURE — 3008F BODY MASS INDEX DOCD: CPT | Mod: CPTII,,, | Performed by: ORTHOPAEDIC SURGERY

## 2023-12-15 NOTE — PROGRESS NOTES
58 years old follow-up of knee pain MRI shows signal changes within the quadriceps tendon suggesting tendinopathy as well as within the patellar tendon.  Cartilaginous loss noted in the medial eminence of the patella suggestive of chondromalacia patella    Exam shows positive patellar crunch no signs infection instability skin is intact compartments are soft *        Assessment:  Chondromalacia patella left knee x6 months in a patient has failed a longstanding nonoperative course including formal physical therapy and injection which did not help at all         We will schedule the patient for knee arthroscopy chondroplasty, patient is aware the risks and limitations of this surgery but still wants to proceed

## 2023-12-17 ENCOUNTER — PATIENT MESSAGE (OUTPATIENT)
Dept: ORTHOPEDICS | Facility: CLINIC | Age: 58
End: 2023-12-17
Payer: MEDICAID

## 2023-12-18 DIAGNOSIS — M22.42 CHONDROMALACIA, PATELLA, LEFT: Primary | ICD-10-CM

## 2023-12-18 DIAGNOSIS — M25.562 LEFT KNEE PAIN: ICD-10-CM

## 2023-12-18 RX ORDER — CEFAZOLIN SODIUM 2 G/50ML
2 SOLUTION INTRAVENOUS
Status: CANCELLED | OUTPATIENT
Start: 2023-12-18

## 2023-12-18 RX ORDER — MUPIROCIN 20 MG/G
OINTMENT TOPICAL
Status: CANCELLED | OUTPATIENT
Start: 2023-12-18

## 2024-01-03 ENCOUNTER — ANESTHESIA EVENT (OUTPATIENT)
Dept: SURGERY | Facility: HOSPITAL | Age: 59
End: 2024-01-03
Payer: MEDICAID

## 2024-01-03 DIAGNOSIS — M22.42 CHONDROMALACIA, PATELLA, LEFT: Primary | ICD-10-CM

## 2024-01-04 ENCOUNTER — HOSPITAL ENCOUNTER (OUTPATIENT)
Facility: HOSPITAL | Age: 59
Discharge: HOME | End: 2024-01-04
Attending: ORTHOPAEDIC SURGERY | Admitting: ORTHOPAEDIC SURGERY
Payer: MEDICAID

## 2024-01-04 ENCOUNTER — ANESTHESIA (OUTPATIENT)
Dept: SURGERY | Facility: HOSPITAL | Age: 59
End: 2024-01-04
Payer: MEDICAID

## 2024-01-04 VITALS
WEIGHT: 175 LBS | TEMPERATURE: 98 F | HEIGHT: 66 IN | BODY MASS INDEX: 28.12 KG/M2 | RESPIRATION RATE: 15 BRPM | SYSTOLIC BLOOD PRESSURE: 121 MMHG | DIASTOLIC BLOOD PRESSURE: 67 MMHG | OXYGEN SATURATION: 100 % | HEART RATE: 72 BPM

## 2024-01-04 DIAGNOSIS — M25.562 ACUTE PAIN OF LEFT KNEE: ICD-10-CM

## 2024-01-04 DIAGNOSIS — M22.42 CHONDROMALACIA, PATELLA, LEFT: Primary | ICD-10-CM

## 2024-01-04 DIAGNOSIS — M22.42 CHONDROMALACIA, PATELLA, LEFT: ICD-10-CM

## 2024-01-04 DIAGNOSIS — M25.562 LEFT KNEE PAIN: ICD-10-CM

## 2024-01-04 PROCEDURE — 63600175 PHARM REV CODE 636 W HCPCS: Mod: PO | Performed by: ANESTHESIOLOGY

## 2024-01-04 PROCEDURE — 71000033 HC RECOVERY, INTIAL HOUR: Mod: PO | Performed by: ORTHOPAEDIC SURGERY

## 2024-01-04 PROCEDURE — 71000015 HC POSTOP RECOV 1ST HR: Mod: PO | Performed by: ORTHOPAEDIC SURGERY

## 2024-01-04 PROCEDURE — D9220A PRA ANESTHESIA: Mod: CRNA,,, | Performed by: NURSE ANESTHETIST, CERTIFIED REGISTERED

## 2024-01-04 PROCEDURE — 36000711: Mod: PO | Performed by: ORTHOPAEDIC SURGERY

## 2024-01-04 PROCEDURE — 37000009 HC ANESTHESIA EA ADD 15 MINS: Mod: PO | Performed by: ORTHOPAEDIC SURGERY

## 2024-01-04 PROCEDURE — 25000003 PHARM REV CODE 250: Mod: PO | Performed by: NURSE ANESTHETIST, CERTIFIED REGISTERED

## 2024-01-04 PROCEDURE — 27200651 HC AIRWAY, LMA: Mod: PO | Performed by: ANESTHESIOLOGY

## 2024-01-04 PROCEDURE — D9220A PRA ANESTHESIA: Mod: ANES,,, | Performed by: ANESTHESIOLOGY

## 2024-01-04 PROCEDURE — 25000003 PHARM REV CODE 250: Mod: PO | Performed by: ORTHOPAEDIC SURGERY

## 2024-01-04 PROCEDURE — 36000710: Mod: PO | Performed by: ORTHOPAEDIC SURGERY

## 2024-01-04 PROCEDURE — 63600175 PHARM REV CODE 636 W HCPCS: Mod: PO | Performed by: NURSE ANESTHETIST, CERTIFIED REGISTERED

## 2024-01-04 PROCEDURE — 27201423 OPTIME MED/SURG SUP & DEVICES STERILE SUPPLY: Mod: PO | Performed by: ORTHOPAEDIC SURGERY

## 2024-01-04 PROCEDURE — 37000008 HC ANESTHESIA 1ST 15 MINUTES: Mod: PO | Performed by: ORTHOPAEDIC SURGERY

## 2024-01-04 PROCEDURE — 29877 ARTHRS KNEE SURG DBRDMT/SHVG: CPT | Mod: LT,,, | Performed by: ORTHOPAEDIC SURGERY

## 2024-01-04 PROCEDURE — 63600175 PHARM REV CODE 636 W HCPCS: Mod: JZ,JG,PO | Performed by: ORTHOPAEDIC SURGERY

## 2024-01-04 PROCEDURE — 63600175 PHARM REV CODE 636 W HCPCS: Mod: PO | Performed by: ORTHOPAEDIC SURGERY

## 2024-01-04 RX ORDER — ONDANSETRON 2 MG/ML
4 INJECTION INTRAMUSCULAR; INTRAVENOUS DAILY PRN
Status: ACTIVE | OUTPATIENT
Start: 2024-01-04

## 2024-01-04 RX ORDER — ACETAMINOPHEN 10 MG/ML
INJECTION, SOLUTION INTRAVENOUS
Status: DISCONTINUED | OUTPATIENT
Start: 2024-01-04 | End: 2024-01-04

## 2024-01-04 RX ORDER — FENTANYL CITRATE 50 UG/ML
INJECTION, SOLUTION INTRAMUSCULAR; INTRAVENOUS
Status: DISCONTINUED | OUTPATIENT
Start: 2024-01-04 | End: 2024-01-04

## 2024-01-04 RX ORDER — MIDAZOLAM HYDROCHLORIDE 1 MG/ML
INJECTION, SOLUTION INTRAMUSCULAR; INTRAVENOUS
Status: DISCONTINUED | OUTPATIENT
Start: 2024-01-04 | End: 2024-01-04

## 2024-01-04 RX ORDER — DEXAMETHASONE SODIUM PHOSPHATE 4 MG/ML
INJECTION, SOLUTION INTRA-ARTICULAR; INTRALESIONAL; INTRAMUSCULAR; INTRAVENOUS; SOFT TISSUE
Status: DISCONTINUED | OUTPATIENT
Start: 2024-01-04 | End: 2024-01-04

## 2024-01-04 RX ORDER — EPHEDRINE SULFATE 50 MG/ML
INJECTION, SOLUTION INTRAVENOUS
Status: DISCONTINUED | OUTPATIENT
Start: 2024-01-04 | End: 2024-01-04

## 2024-01-04 RX ORDER — OXYCODONE AND ACETAMINOPHEN 5; 325 MG/1; MG/1
1 TABLET ORAL
Qty: 32 TABLET | Refills: 0 | Status: SHIPPED | OUTPATIENT
Start: 2024-01-04

## 2024-01-04 RX ORDER — KETAMINE HCL IN 0.9 % NACL 50 MG/5 ML
SYRINGE (ML) INTRAVENOUS
Status: DISCONTINUED | OUTPATIENT
Start: 2024-01-04 | End: 2024-01-04

## 2024-01-04 RX ORDER — FENTANYL CITRATE 50 UG/ML
25 INJECTION, SOLUTION INTRAMUSCULAR; INTRAVENOUS EVERY 5 MIN PRN
Status: ACTIVE | OUTPATIENT
Start: 2024-01-04

## 2024-01-04 RX ORDER — HYDROMORPHONE HYDROCHLORIDE 2 MG/ML
0.2 INJECTION, SOLUTION INTRAMUSCULAR; INTRAVENOUS; SUBCUTANEOUS EVERY 5 MIN PRN
Status: ACTIVE | OUTPATIENT
Start: 2024-01-04

## 2024-01-04 RX ORDER — LIDOCAINE HYDROCHLORIDE 10 MG/ML
1 INJECTION, SOLUTION EPIDURAL; INFILTRATION; INTRACAUDAL; PERINEURAL ONCE
Status: ACTIVE | OUTPATIENT
Start: 2024-01-04

## 2024-01-04 RX ORDER — EPINEPHRINE 1 MG/ML
INJECTION INTRAMUSCULAR; INTRAVENOUS; SUBCUTANEOUS
Status: DISCONTINUED | OUTPATIENT
Start: 2024-01-04 | End: 2024-01-04 | Stop reason: HOSPADM

## 2024-01-04 RX ORDER — MUPIROCIN 20 MG/G
OINTMENT TOPICAL
Status: DISCONTINUED | OUTPATIENT
Start: 2024-01-04 | End: 2024-01-04 | Stop reason: HOSPADM

## 2024-01-04 RX ORDER — PROPOFOL 10 MG/ML
VIAL (ML) INTRAVENOUS
Status: DISCONTINUED | OUTPATIENT
Start: 2024-01-04 | End: 2024-01-04

## 2024-01-04 RX ORDER — MEPERIDINE HYDROCHLORIDE 50 MG/ML
12.5 INJECTION INTRAMUSCULAR; INTRAVENOUS; SUBCUTANEOUS ONCE AS NEEDED
Status: ACTIVE | OUTPATIENT
Start: 2024-01-04 | End: 2024-01-05

## 2024-01-04 RX ORDER — SODIUM CHLORIDE, SODIUM LACTATE, POTASSIUM CHLORIDE, CALCIUM CHLORIDE 600; 310; 30; 20 MG/100ML; MG/100ML; MG/100ML; MG/100ML
INJECTION, SOLUTION INTRAVENOUS CONTINUOUS
Status: DISPENSED | OUTPATIENT
Start: 2024-01-04

## 2024-01-04 RX ORDER — DIPHENHYDRAMINE HYDROCHLORIDE 50 MG/ML
12.5 INJECTION, SOLUTION INTRAMUSCULAR; INTRAVENOUS EVERY 6 HOURS PRN
Status: ACTIVE | OUTPATIENT
Start: 2024-01-04

## 2024-01-04 RX ORDER — ONDANSETRON 2 MG/ML
INJECTION INTRAMUSCULAR; INTRAVENOUS
Status: DISCONTINUED | OUTPATIENT
Start: 2024-01-04 | End: 2024-01-04

## 2024-01-04 RX ORDER — BUPIVACAINE HYDROCHLORIDE 2.5 MG/ML
INJECTION, SOLUTION EPIDURAL; INFILTRATION; INTRACAUDAL
Status: DISCONTINUED | OUTPATIENT
Start: 2024-01-04 | End: 2024-01-04 | Stop reason: HOSPADM

## 2024-01-04 RX ORDER — CEFAZOLIN SODIUM 2 G/50ML
2 SOLUTION INTRAVENOUS
Status: COMPLETED | OUTPATIENT
Start: 2024-01-04 | End: 2024-01-04

## 2024-01-04 RX ORDER — OXYCODONE HYDROCHLORIDE 5 MG/1
5 TABLET ORAL
Status: ACTIVE | OUTPATIENT
Start: 2024-01-04

## 2024-01-04 RX ORDER — LIDOCAINE HYDROCHLORIDE 20 MG/ML
INJECTION INTRAVENOUS
Status: DISCONTINUED | OUTPATIENT
Start: 2024-01-04 | End: 2024-01-04

## 2024-01-04 RX ADMIN — EPHEDRINE SULFATE 5 MG: 50 INJECTION INTRAVENOUS at 08:01

## 2024-01-04 RX ADMIN — SODIUM CHLORIDE, POTASSIUM CHLORIDE, SODIUM LACTATE AND CALCIUM CHLORIDE: 600; 310; 30; 20 INJECTION, SOLUTION INTRAVENOUS at 07:01

## 2024-01-04 RX ADMIN — Medication 20 MG: at 07:01

## 2024-01-04 RX ADMIN — MIDAZOLAM HYDROCHLORIDE 2 MG: 1 INJECTION, SOLUTION INTRAMUSCULAR; INTRAVENOUS at 07:01

## 2024-01-04 RX ADMIN — ONDANSETRON 4 MG: 2 INJECTION, SOLUTION INTRAMUSCULAR; INTRAVENOUS at 08:01

## 2024-01-04 RX ADMIN — MUPIROCIN: 20 OINTMENT TOPICAL at 07:01

## 2024-01-04 RX ADMIN — ACETAMINOPHEN 1000 MG: 10 INJECTION, SOLUTION INTRAVENOUS at 08:01

## 2024-01-04 RX ADMIN — GLYCOPYRROLATE 0.2 MG: 0.2 INJECTION, SOLUTION INTRAMUSCULAR; INTRAVENOUS at 07:01

## 2024-01-04 RX ADMIN — PROPOFOL 200 MG: 10 INJECTION, EMULSION INTRAVENOUS at 07:01

## 2024-01-04 RX ADMIN — LIDOCAINE HYDROCHLORIDE 75 MG: 20 INJECTION INTRAVENOUS at 07:01

## 2024-01-04 RX ADMIN — FENTANYL CITRATE 50 MCG: 0.05 INJECTION, SOLUTION INTRAMUSCULAR; INTRAVENOUS at 07:01

## 2024-01-04 RX ADMIN — DEXAMETHASONE SODIUM PHOSPHATE 4 MG: 4 INJECTION, SOLUTION INTRAMUSCULAR; INTRAVENOUS at 07:01

## 2024-01-04 RX ADMIN — CEFAZOLIN SODIUM 2 G: 2 SOLUTION INTRAVENOUS at 07:01

## 2024-01-04 NOTE — ANESTHESIA PROCEDURE NOTES
LMA    Date/Time: 1/4/2024 7:53 AM    Performed by: Radha Castaneda CRNA  Authorized by: Radha Castaneda CRNA    Intubation:     Induction:  Intravenous    Intubated:  Postinduction    Mask Ventilation:  Easy mask    Attempts:  1    Attempted By:  CRNA    Difficult Airway Encountered?: No      Complications:  None    Airway Device:  Supraglottic airway/LMA    Airway Device Size:  4.0    Style/Cuff Inflation:  Cuffed (inflated to minimal occlusive pressure)    Inflation Amount (mL):  12    Secured at:  The lips    Placement Verified By:  Capnometry    Complicating Factors:  None    Findings Post-Intubation:  Atraumatic/condition of teeth unchanged      
Patient is 18 to 64 years of age with chronic diseases or conditions

## 2024-01-04 NOTE — OP NOTE
[facility] - Surgery  Operative Note    SUMMARY     Date of Procedure: 1/4/2024     Pre-Operative Diagnosis: Chondromalacia, patella, left [M22.42]  Left knee pain [M25.562]    Post-Operative Diagnosis: Post-Op Diagnosis Codes:     * Chondromalacia, patella, left [M22.42]     * Left knee pain [M25.562]  Plica band left knee    Procedure: Procedure(s) (LRB):  ARTHROSCOPY, KNEE, WITH CHONDROPLASTY (Left)     Limited synovectomy in the form of plica band excision    Surgeon(s) and Role:     * Jeyson Reyes MD - Primary    Indications for procedure:      Procedure in Detail:  After obtaining consent and starting the patient on preoperative IV antibiotics, patient taken back to the operating room where general anesthesia was performed that she is team.  The left lower extremity was prepped and draped in normal sterile fashion.  After 10 minutes of elevation the pneumatic tourniquet was inflated to 300 mm of mercury.  Standard inferior lateral portal established arthroscope introduced into the knee.  Patellofemoral compartment showed areas of grade 3 chondrosis.  Went down the medial compartment noted plica band established our medial portal inspected medial compartment looked healthy meniscal and chondral surfaces were intact.  In the notch the ACL is intact.  Placed the knee into the figure 4 position inspected the lateral compartment looked healthy as well.  We then back to the patellofemoral compartment performed a chondroplasty here with a shaver blade kept perpendicular to the articular surface we also used the shaver blade to take down the plica band that articulated with the medial femoral condyle.  We then spent some time irrigating saline solution the knee evacuated saline solution closed portal sites with nylon stitches injected knee course of Marcaine plain sterile dressing applied tourniquet deflated this concluded the operative procedure    Anesthesia: General    Complications: No    Estimated Blood Loss  (EBL): * No values recorded between 1/4/2024  8:06 AM and 1/4/2024  8:32 AM *           Implants: * No implants in log *    Specimens:   Specimen (24h ago, onward)      None

## 2024-01-04 NOTE — DISCHARGE INSTRUCTIONS
KNEE ARTHROSCOPY    DO:   Keep leg elevated while at rest until return appointment.   Use crutches at all times, unless otherwise directed by your physician.   Apply ice to knee next 2 days for 20 min intervals.  Remove ace wrap 2 days after surgery. You may shower, pat dry, and place band aids over the operative site. Re-wrap your knee with the ace bandage.   Check circulation frequently in toes by pressing down on nail. Nail should turn white then pink. Your extremity should not be bluish gray.         DO NOT:   Do not soak in a tub or pool.   Do not take additional tylenol/acetaminophen while taking narcotic pain medication that contains tylenol/acetaminophen.     CALL PHYSICIAN FOR:   Bleeding or signs of infection (redness, swelling, draining pus or warm to touch).   Fever greater than 101.   Persistent pain not relieved by pain medication.    RETURN APPOINTMENT AS INSTRUCTED  CALL [phone] for doctor.

## 2024-01-04 NOTE — INTERVAL H&P NOTE
The patient has been examined and the H&P has been reviewed:    I concur with the findings and no changes have occurred since H&P was written.    Surgery risks, benefits and alternative options discussed and understood by patient/family.          There are no hospital problems to display for this patient.  Imaging studies ordered and reviewed by me    Further History  Aching pain  Worse with activity  Relieved with rest  No other associated symptoms  No other radiation    Further Exam  Alert and oriented  Pleasant  Contralateral limb has appropriate range of motion for age and condition  Contralateral limb has appropriate strength for age and condition  Contralateral limb has appropriate stability  for age and condition  No adenopathy  Pulses are appropriate for current condition  Skin is intact        Chief Complaint    No chief complaint on file.      HPI  Abigail Smith is a 58 y.o.  female who presents with       Past Medical History  Past Medical History:   Diagnosis Date    Diabetes mellitus     gestational; pre-diabetic as of 23    Gallstones     Mixed hyperlipidemia        Past Surgical History  Past Surgical History:   Procedure Laterality Date     SECTION  10/19/1994    CHOLECYSTECTOMY  2022    HYSTERECTOMY  10/2012    Partial - cervix & ovaries remain    LAPAROSCOPIC CHOLECYSTECTOMY N/A 2022    Procedure: CHOLECYSTECTOMY, LAPAROSCOPIC;  Surgeon: Justin Champagne MD;  Location: [facility];  Service: General;  Laterality: N/A;    TONSILLECTOMY  2002       Medications  Current Facility-Administered Medications   Medication    cefazolin (ANCEF) 2 gram in dextrose 5% 50 mL IVPB (premix)    electrolyte-S (ISOLYTE)    lactated ringers infusion    LIDOcaine (PF) 10 mg/ml (1%) injection 10 mg    mupirocin 2 % ointment       Allergies  Review of patient's allergies indicates:   Allergen Reactions    Iodine and iodide containing products Hives    Sulfa (sulfonamide antibiotics)     Scallops  Nausea Only       Family History  Family History   Problem Relation Age of Onset    Diabetes Mother     Hearing loss Mother     Hyperlipidemia Mother     Arthritis Father         RA    Hearing loss Father     Arthritis Sister         Diagnosed with RA at 24 years of age       Social History  Social History     Socioeconomic History    Marital status:    Tobacco Use    Smoking status: Never    Smokeless tobacco: Never   Substance and Sexual Activity    Alcohol use: Yes     Alcohol/week: 4.0 standard drinks of alcohol     Types: 3 Glasses of wine, 1 Cans of beer per week     Comment: ocassional    Drug use: Never    Sexual activity: Yes     Partners: Male     Birth control/protection: Partner-Vasectomy     Social Determinants of Health     Financial Resource Strain: Low Risk  (12/8/2023)    Overall Financial Resource Strain (CARDIA)     Difficulty of Paying Living Expenses: Not hard at all   Food Insecurity: No Food Insecurity (12/8/2023)    Hunger Vital Sign     Worried About Running Out of Food in the Last Year: Never true     Ran Out of Food in the Last Year: Never true   Transportation Needs: No Transportation Needs (12/8/2023)    PRAPARE - Transportation     Lack of Transportation (Medical): No     Lack of Transportation (Non-Medical): No   Physical Activity: Insufficiently Active (12/8/2023)    Exercise Vital Sign     Days of Exercise per Week: 3 days     Minutes of Exercise per Session: 30 min   Stress: Stress Concern Present (12/8/2023)    [employer] - Occupational Stress Questionnaire     Feeling of Stress : To some extent   Social Connections: Unknown (12/8/2023)    Social Connection and Isolation Panel [NHANES]     Frequency of Communication with Friends and Family: More than three times a week     Frequency of Social Gatherings with Friends and Family: Once a week     Active Member of Clubs or Organizations: No     Attends Club or Organization Meetings: Never      Marital Status:    Housing Stability: Low Risk  (12/8/2023)    Housing Stability Vital Sign     Unable to Pay for Housing in the Last Year: No     Number of Places Lived in the Last Year: 1     Unstable Housing in the Last Year: No               Review of Systems     Constitutional: Negative    HENT: Negative  Eyes: Negative  Respiratory: Negative  Cardiovascular: Negative  Musculoskeletal: HPI  Skin: Negative  Neurological: Negative  Hematological: Negative  Endocrine: Negative                 Physical Exam    Vitals:    01/04/24 0700   BP: (!) 167/85   Pulse: 69   Resp: 18   Temp: 98 °F (36.7 °C)     Body mass index is 28.25 kg/m².  Physical Examination:     General appearance -  well appearing, and in no distress  Mental status - awake  Neck - supple  Chest -  symmetric air entry  Heart - normal rate   Abdomen - soft      Assessment     1. Chondromalacia, patella, left    2. Left knee pain          Plan

## 2024-01-04 NOTE — ADDENDUM NOTE
Addendum  created 01/04/24 1106 by Radha Castaneda CRNA    Flowsheet accepted, Intraprocedure Flowsheets edited

## 2024-01-04 NOTE — ANESTHESIA POSTPROCEDURE EVALUATION
Anesthesia Post Evaluation    Patient: Abigail Smith    Procedure(s) Performed: Procedure(s) (LRB):  ARTHROSCOPY, KNEE, WITH CHONDROPLASTY (Left)    Final Anesthesia Type: general      Patient location during evaluation: PACU  Patient participation: Yes- Able to Participate  Level of consciousness: awake  Post-procedure vital signs: reviewed and stable  Pain management: adequate  Airway patency: patent    PONV status at discharge: No PONV  Anesthetic complications: no      Cardiovascular status: blood pressure returned to baseline  Respiratory status: unassisted  Hydration status: euvolemic  Follow-up not needed.              Vitals Value Taken Time   /67 01/04/24 0854   Temp  01/04/24 0949   Pulse 72 01/04/24 0854   Resp 15 01/04/24 0854   SpO2 100 % 01/04/24 0854         Event Time   Out of Recovery 08:54:00         Pain/Aiyana Score: Pain Rating Prior to Med Admin: 0 (1/4/2024  8:54 AM)  Pain Rating Post Med Admin: 0 (1/4/2024  8:54 AM)  Aiyana Score: 10 (1/4/2024  8:54 AM)

## 2024-01-04 NOTE — ADDENDUM NOTE
Addendum  created 01/04/24 0955 by Radha Castaneda CRNA    Intraprocedure Meds edited, Orders acknowledged in Narrator

## 2024-01-04 NOTE — ANESTHESIA PREPROCEDURE EVALUATION
01/04/2024  Abigail Smith is a 58 y.o., female.      Pre-op Assessment    I have reviewed the Patient Summary Reports.     I have reviewed the Nursing Notes. I have reviewed the NPO Status.   I have reviewed the Medications.     Review of Systems  Anesthesia Hx:  No problems with previous Anesthesia                Cardiovascular:                hyperlipidemia                             Pulmonary:  Pulmonary Normal                       Endocrine:  Diabetes    Diabetes                          Physical Exam  General: Well nourished    Airway:  Mallampati: II   Mouth Opening: Normal  TM Distance: Normal  Neck ROM: Normal ROM        Anesthesia Plan  Type of Anesthesia, risks & benefits discussed:    Anesthesia Type: Gen Supraglottic Airway  Intra-op Monitoring Plan: Standard ASA Monitors  Induction:  IV  Informed Consent: Informed consent signed with the Patient and all parties understand the risks and agree with anesthesia plan.  All questions answered.   ASA Score: 2    Ready For Surgery From Anesthesia Perspective.     .

## 2024-01-04 NOTE — TRANSFER OF CARE
"Anesthesia Transfer of Care Note    Patient: Abigail Smith    Procedure(s) Performed: Procedure(s) (LRB):  ARTHROSCOPY, KNEE, WITH CHONDROPLASTY (Left)    Patient location: PACU    Anesthesia Type: general    Transport from OR: Transported from OR on room air with adequate spontaneous ventilation    Post pain: adequate analgesia    Post assessment: no apparent anesthetic complications    Post vital signs: stable    Level of consciousness: awake and sedated    Nausea/Vomiting: no nausea/vomiting    Complications: none    Transfer of care protocol was followed      Last vitals: Visit Vitals  /61 (BP Location: Right arm, Patient Position: Lying)   Pulse 78   Temp 36.7 °C (98 °F) (Skin)   Resp 15   Ht 5' 6" (1.676 m)   Wt 79.4 kg (175 lb)   SpO2 97%   Breastfeeding No   BMI 28.25 kg/m²     "

## 2024-01-05 NOTE — DISCHARGE SUMMARY
Discharge Note  Short Stay      SUMMARY     Admit Date: 1/4/2024    Attending Physician: No att. providers found     Discharge Physician: No att. providers found    Discharge Date: 1/5/2024 1:02 PM    Final Diagnosis: Chondromalacia, patella, left [M22.42]  Left knee pain [M25.562]    Hospital Course: Patient tolerated procedure well.     Disposition: Home or Self Care    Patient Instructions:   Discharge Medication List as of 1/4/2024  8:52 AM        START taking these medications    Details   oxyCODONE-acetaminophen (PERCOCET) 5-325 mg per tablet Take 1 tablet by mouth every 4 to 6 hours as needed for Pain., Starting Thu 1/4/2024, Print           CONTINUE these medications which have NOT CHANGED    Details   atorvastatin (LIPITOR) 20 MG tablet Take 1 tablet (20 mg total) by mouth once daily., Starting Mon 7/10/2023, Until Tue 7/9/2024, Normal      doxycycline (MONODOX) 100 MG capsule Take 1 capsule (100 mg total) by mouth once daily., Starting Mon 7/10/2023, Normal      metroNIDAZOLE (METROGEL) 0.75 % gel Apply topically 2 (two) times daily., Starting Mon 7/10/2023, Until Tue 7/9/2024, Normal             Discharge Procedure Orders (must include Diet, Follow-up, Activity):   Discharge Procedure Orders (must include Diet, Follow-up, Activity)   Keep surgical extremity elevated     Ice to affected area     Remove dressing in 48 hours     Activity as tolerated     Weight bearing restrictions (specify):   Order Comments: Weight-bearing to tolerance, use crutches for comfort        Follow Up:  Follow up as scheduled.  Resume routine diet.  Activity as tolerated.    No driving day of procedure.

## 2024-01-19 ENCOUNTER — OFFICE VISIT (OUTPATIENT)
Dept: ORTHOPEDICS | Facility: CLINIC | Age: 59
End: 2024-01-19
Payer: MEDICAID

## 2024-01-19 VITALS — BODY MASS INDEX: 28.12 KG/M2 | HEIGHT: 66 IN | WEIGHT: 175 LBS

## 2024-01-19 DIAGNOSIS — Z98.890 S/P ARTHROSCOPY OF LEFT KNEE: Primary | ICD-10-CM

## 2024-01-19 PROCEDURE — 99999 PR PBB SHADOW E&M-EST. PATIENT-LVL III: CPT | Mod: PBBFAC,,, | Performed by: ORTHOPAEDIC SURGERY

## 2024-01-19 PROCEDURE — 1159F MED LIST DOCD IN RCRD: CPT | Mod: CPTII,,, | Performed by: ORTHOPAEDIC SURGERY

## 2024-01-19 PROCEDURE — 99213 OFFICE O/P EST LOW 20 MIN: CPT | Mod: PBBFAC,PO | Performed by: ORTHOPAEDIC SURGERY

## 2024-01-19 PROCEDURE — 99024 POSTOP FOLLOW-UP VISIT: CPT | Mod: ,,, | Performed by: ORTHOPAEDIC SURGERY

## 2024-06-20 DIAGNOSIS — L71.9 ROSACEA: ICD-10-CM

## 2024-06-20 RX ORDER — DOXYCYCLINE 100 MG/1
100 CAPSULE ORAL
Qty: 30 CAPSULE | Refills: 0 | Status: SHIPPED | OUTPATIENT
Start: 2024-06-20

## 2024-07-01 ENCOUNTER — PATIENT MESSAGE (OUTPATIENT)
Dept: FAMILY MEDICINE | Facility: CLINIC | Age: 59
End: 2024-07-01
Payer: COMMERCIAL

## 2024-07-01 DIAGNOSIS — Z00.00 PHYSICAL EXAM, ROUTINE: Primary | ICD-10-CM

## 2024-07-05 ENCOUNTER — LAB VISIT (OUTPATIENT)
Dept: LAB | Facility: HOSPITAL | Age: 59
End: 2024-07-05
Payer: COMMERCIAL

## 2024-07-05 DIAGNOSIS — Z00.00 PHYSICAL EXAM, ROUTINE: ICD-10-CM

## 2024-07-05 LAB
ALBUMIN SERPL BCP-MCNC: 4.3 G/DL (ref 3.5–5.2)
ALP SERPL-CCNC: 67 U/L (ref 55–135)
ALT SERPL W/O P-5'-P-CCNC: 27 U/L (ref 10–44)
ANION GAP SERPL CALC-SCNC: 11 MMOL/L (ref 8–16)
AST SERPL-CCNC: 17 U/L (ref 10–40)
BILIRUB SERPL-MCNC: 0.5 MG/DL (ref 0.1–1)
BUN SERPL-MCNC: 14 MG/DL (ref 6–20)
CALCIUM SERPL-MCNC: 9.6 MG/DL (ref 8.7–10.5)
CHLORIDE SERPL-SCNC: 105 MMOL/L (ref 95–110)
CHOLEST SERPL-MCNC: 191 MG/DL (ref 120–199)
CHOLEST/HDLC SERPL: 3.4 {RATIO} (ref 2–5)
CO2 SERPL-SCNC: 23 MMOL/L (ref 23–29)
CREAT SERPL-MCNC: 0.8 MG/DL (ref 0.5–1.4)
ERYTHROCYTE [DISTWIDTH] IN BLOOD BY AUTOMATED COUNT: 14.7 % (ref 11.5–14.5)
EST. GFR  (NO RACE VARIABLE): >60 ML/MIN/1.73 M^2
ESTIMATED AVG GLUCOSE: 126 MG/DL (ref 68–131)
GLUCOSE SERPL-MCNC: 123 MG/DL (ref 70–110)
HBA1C MFR BLD: 6 % (ref 4–5.6)
HCT VFR BLD AUTO: 41 % (ref 37–48.5)
HDLC SERPL-MCNC: 56 MG/DL (ref 40–75)
HDLC SERPL: 29.3 % (ref 20–50)
HGB BLD-MCNC: 13.1 G/DL (ref 12–16)
LDLC SERPL CALC-MCNC: 109 MG/DL (ref 63–159)
MCH RBC QN AUTO: 28.6 PG (ref 27–31)
MCHC RBC AUTO-ENTMCNC: 32 G/DL (ref 32–36)
MCV RBC AUTO: 90 FL (ref 82–98)
NONHDLC SERPL-MCNC: 135 MG/DL
PLATELET # BLD AUTO: 272 K/UL (ref 150–450)
PMV BLD AUTO: 11.8 FL (ref 9.2–12.9)
POTASSIUM SERPL-SCNC: 4 MMOL/L (ref 3.5–5.1)
PROT SERPL-MCNC: 7.5 G/DL (ref 6–8.4)
RBC # BLD AUTO: 4.58 M/UL (ref 4–5.4)
SODIUM SERPL-SCNC: 139 MMOL/L (ref 136–145)
TRIGL SERPL-MCNC: 130 MG/DL (ref 30–150)
WBC # BLD AUTO: 8.54 K/UL (ref 3.9–12.7)

## 2024-07-05 PROCEDURE — 83036 HEMOGLOBIN GLYCOSYLATED A1C: CPT | Performed by: INTERNAL MEDICINE

## 2024-07-05 PROCEDURE — 36415 COLL VENOUS BLD VENIPUNCTURE: CPT | Mod: PO | Performed by: INTERNAL MEDICINE

## 2024-07-05 PROCEDURE — 80061 LIPID PANEL: CPT | Performed by: INTERNAL MEDICINE

## 2024-07-05 PROCEDURE — 80053 COMPREHEN METABOLIC PANEL: CPT | Performed by: INTERNAL MEDICINE

## 2024-07-05 PROCEDURE — 85027 COMPLETE CBC AUTOMATED: CPT | Performed by: INTERNAL MEDICINE

## 2024-07-10 ENCOUNTER — OFFICE VISIT (OUTPATIENT)
Dept: FAMILY MEDICINE | Facility: CLINIC | Age: 59
End: 2024-07-10
Payer: COMMERCIAL

## 2024-07-10 ENCOUNTER — HOSPITAL ENCOUNTER (OUTPATIENT)
Dept: RADIOLOGY | Facility: HOSPITAL | Age: 59
Discharge: HOME | End: 2024-07-10
Attending: INTERNAL MEDICINE
Payer: COMMERCIAL

## 2024-07-10 VITALS
DIASTOLIC BLOOD PRESSURE: 80 MMHG | BODY MASS INDEX: 28.98 KG/M2 | WEIGHT: 179.56 LBS | HEART RATE: 72 BPM | SYSTOLIC BLOOD PRESSURE: 122 MMHG | OXYGEN SATURATION: 100 %

## 2024-07-10 DIAGNOSIS — R73.03 PREDIABETES: ICD-10-CM

## 2024-07-10 DIAGNOSIS — E78.2 MIXED HYPERLIPIDEMIA: ICD-10-CM

## 2024-07-10 DIAGNOSIS — Z12.31 ENCOUNTER FOR SCREENING MAMMOGRAM FOR BREAST CANCER: ICD-10-CM

## 2024-07-10 DIAGNOSIS — F43.21 ADJUSTMENT DISORDER WITH DEPRESSED MOOD: ICD-10-CM

## 2024-07-10 DIAGNOSIS — Z00.00 PHYSICAL EXAM, ROUTINE: Primary | ICD-10-CM

## 2024-07-10 PROCEDURE — 99396 PREV VISIT EST AGE 40-64: CPT | Mod: S$GLB,,, | Performed by: INTERNAL MEDICINE

## 2024-07-10 PROCEDURE — 77067 SCR MAMMO BI INCL CAD: CPT | Mod: 26,,, | Performed by: RADIOLOGY

## 2024-07-10 PROCEDURE — 77067 SCR MAMMO BI INCL CAD: CPT | Mod: TC,PO

## 2024-07-10 PROCEDURE — 3079F DIAST BP 80-89 MM HG: CPT | Mod: CPTII,S$GLB,, | Performed by: INTERNAL MEDICINE

## 2024-07-10 PROCEDURE — 3044F HG A1C LEVEL LT 7.0%: CPT | Mod: CPTII,S$GLB,, | Performed by: INTERNAL MEDICINE

## 2024-07-10 PROCEDURE — 3008F BODY MASS INDEX DOCD: CPT | Mod: CPTII,S$GLB,, | Performed by: INTERNAL MEDICINE

## 2024-07-10 PROCEDURE — 99999 PR PBB SHADOW E&M-EST. PATIENT-LVL III: CPT | Mod: PBBFAC,,, | Performed by: INTERNAL MEDICINE

## 2024-07-10 PROCEDURE — 1159F MED LIST DOCD IN RCRD: CPT | Mod: CPTII,S$GLB,, | Performed by: INTERNAL MEDICINE

## 2024-07-10 PROCEDURE — 3074F SYST BP LT 130 MM HG: CPT | Mod: CPTII,S$GLB,, | Performed by: INTERNAL MEDICINE

## 2024-07-10 PROCEDURE — 77063 BREAST TOMOSYNTHESIS BI: CPT | Mod: TC,PO

## 2024-07-10 PROCEDURE — 77063 BREAST TOMOSYNTHESIS BI: CPT | Mod: 26,,, | Performed by: RADIOLOGY

## 2024-07-10 PROCEDURE — 1160F RVW MEDS BY RX/DR IN RCRD: CPT | Mod: CPTII,S$GLB,, | Performed by: INTERNAL MEDICINE

## 2024-07-10 RX ORDER — ATORVASTATIN CALCIUM 20 MG/1
20 TABLET, FILM COATED ORAL DAILY
Qty: 90 TABLET | Refills: 3 | Status: SHIPPED | OUTPATIENT
Start: 2024-07-10

## 2024-07-10 NOTE — PROGRESS NOTES
Subjective     Abigail Smith is a 58 y.o. old, female here for a health maintenance visit.    59 y/o with PMH of prediabetes, HLD     Patient has concerns about worsening insomnia, some memory problems, depressed mood, anhedonia, not wanting to get out of bed, crying spells.  FH of RA. Recent right wrist and one MCP joint in right hand has been painful and swollen, right ankle with limited ROM and pain.    Health Habits  Exercise and Activity: not as regular as she would like  Diet: very healthy, also cooks gluten free - grand daughter now has Celiac's    Sexual Health  Sexually active: with     Mental Health  Depression as above, never treated previously with therapy or medications.    History     Past Medical History:   Diagnosis Date    Diabetes mellitus     gestational; pre-diabetic as of 23    Gallstones     Mixed hyperlipidemia      Past Surgical History:   Procedure Laterality Date    ARTHROSCOPIC CHONDROPLASTY OF KNEE JOINT Left 2024    Procedure: ARTHROSCOPY, KNEE, WITH CHONDROPLASTY;  Surgeon: Jeyson Reyes MD;  Location: [facility] OR;  Service: Orthopedics;  Laterality: Left;  knee arthroscopy chondroplasty     SECTION  10/19/1994    CHOLECYSTECTOMY  2022    HYSTERECTOMY  10/2012    Partial - cervix & ovaries remain    LAPAROSCOPIC CHOLECYSTECTOMY N/A 2022    Procedure: CHOLECYSTECTOMY, LAPAROSCOPIC;  Surgeon: Justin Champagne MD;  Location: [facility] OR;  Service: General;  Laterality: N/A;    TONSILLECTOMY  2002     Review of patient's allergies indicates:   Allergen Reactions    Iodine and iodide containing products Hives    Sulfa (sulfonamide antibiotics)     Scallops Nausea Only     Outpatient Medications Marked as Taking for the 7/10/24 encounter (Office Visit) with Paulino Hope MD   Medication Sig Dispense Refill    doxycycline (MONODOX) 100 MG capsule TAKE 1 CAPSULE BY MOUTH ONCE DAILY. 30 capsule 0    [DISCONTINUED] atorvastatin (LIPITOR) 20 MG tablet TAKE  1 TABLET BY MOUTH EVERY DAY 90 tablet 0     Social History     Tobacco Use    Smoking status: Never    Smokeless tobacco: Never   Substance Use Topics    Alcohol use: Yes     Alcohol/week: 4.0 standard drinks of alcohol     Types: 3 Glasses of wine, 1 Cans of beer per week     Comment: ocassional    Drug use: Never     Family History   Problem Relation Name Age of Onset    Diabetes Mother Bouchra Gaming     Hearing loss Mother Bouchra Gaming     Hyperlipidemia Mother Bouchra Gaming     Arthritis Father Michael Gaming         RA    Hearing loss Father Michael Gaming     Diabetes Father Michael Gaming     Arthritis Sister Eneida Sena         Diagnosed with RA at 24 years of age       Review of Systems   Review of Systems   Constitutional:  Positive for malaise/fatigue. Negative for chills and fever.   HENT:  Negative for ear pain and sinus pain.    Eyes:  Negative for blurred vision and pain.   Respiratory:  Negative for cough and shortness of breath.    Cardiovascular:  Negative for chest pain and palpitations.   Gastrointestinal:  Negative for abdominal pain and nausea.   Genitourinary:  Negative for dysuria and frequency.   Musculoskeletal:  Positive for joint pain. Negative for myalgias.   Skin:  Negative for itching and rash.   Neurological:  Negative for weakness and headaches.   Endo/Heme/Allergies:  Negative for environmental allergies. Does not bruise/bleed easily.   Psychiatric/Behavioral:  Positive for depression. The patient has insomnia.      Objective   /80   Pulse 72   Wt 81.4 kg (179 lb 9 oz)   SpO2 100%   BMI 28.98 kg/m²   Physical Exam  Constitutional:       General: She is not in acute distress.     Appearance: Normal appearance. She is well-developed.   HENT:      Head: Normocephalic and atraumatic.   Eyes:      Conjunctiva/sclera: Conjunctivae normal.      Pupils: Pupils are equal, round, and reactive to light.   Neck:      Thyroid: No thyroid mass or thyromegaly.   Cardiovascular:       Rate and Rhythm: Normal rate and regular rhythm.      Heart sounds: Normal heart sounds. No murmur heard.  Pulmonary:      Effort: No respiratory distress.      Breath sounds: Normal breath sounds.   Abdominal:      General: Bowel sounds are normal.      Palpations: Abdomen is soft.      Tenderness: There is no abdominal tenderness.   Musculoskeletal:         General: No deformity.      Cervical back: Neck supple.   Lymphadenopathy:      Cervical: No cervical adenopathy.      Upper Body:      Right upper body: No supraclavicular adenopathy.      Left upper body: No supraclavicular adenopathy.   Skin:     General: Skin is warm and dry.      Findings: No rash.   Neurological:      Mental Status: She is alert and oriented to person, place, and time.   Psychiatric:         Behavior: Behavior normal.       Assessment and Plan     Physical exam, routine    Mixed hyperlipidemia  -     atorvastatin (LIPITOR) 20 MG tablet; Take 1 tablet (20 mg total) by mouth once daily.  Dispense: 90 tablet; Refill: 3    Prediabetes    Encounter for screening mammogram for breast cancer  -     Mammo Digital Screening Bilat w/ Mark; Future; Expected date: 07/10/2024    Adjustment disorder with depressed mood      Age appropriate screening recommended today.  Health maintenance reviewed and recommendations made based on USPTF recommendations.   Encourage healthy diet and exercise.    F/u as needed for depression.    ___________________  Paulino Hope MD  Internal Medicine and Pediatrics

## 2024-07-18 DIAGNOSIS — L71.9 ROSACEA: ICD-10-CM

## 2024-07-18 RX ORDER — DOXYCYCLINE 100 MG/1
100 CAPSULE ORAL
Qty: 30 CAPSULE | Refills: 0 | Status: SHIPPED | OUTPATIENT
Start: 2024-07-18

## 2024-07-18 NOTE — TELEPHONE ENCOUNTER
Refill Routing Note   Medication(s) are not appropriate for processing by [facility] for the following reason(s):        Outside of protocol    [facility] action(s):  Route               Appointments  past 12m or future 3m with PCP    Date Provider   Last Visit   7/10/2024 Paulino Hope MD   Next Visit   Visit date not found Paulino Hope MD   ED visits in past 90 days: 0        Note composed:9:06 AM 07/18/2024

## 2024-08-13 DIAGNOSIS — L71.9 ROSACEA: ICD-10-CM

## 2024-08-13 RX ORDER — DOXYCYCLINE 100 MG/1
100 CAPSULE ORAL
Qty: 30 CAPSULE | Refills: 0 | Status: SHIPPED | OUTPATIENT
Start: 2024-08-13

## 2024-08-13 NOTE — TELEPHONE ENCOUNTER
Refill Routing Note   Medication(s) are not appropriate for processing by [facility] for the following reason(s):        Outside of protocol    [facility] action(s):  Route               Appointments  past 12m or future 3m with PCP    Date Provider   Last Visit   7/10/2024 Paulino Hope MD   Next Visit   Visit date not found Paulino Hope MD   ED visits in past 90 days: 0        Note composed:9:27 AM 08/13/2024

## 2024-09-13 DIAGNOSIS — L71.9 ROSACEA: ICD-10-CM

## 2024-09-13 RX ORDER — DOXYCYCLINE 100 MG/1
100 CAPSULE ORAL
Qty: 30 CAPSULE | Refills: 0 | Status: SHIPPED | OUTPATIENT
Start: 2024-09-13

## 2024-10-11 DIAGNOSIS — L71.9 ROSACEA: ICD-10-CM

## 2024-10-11 RX ORDER — DOXYCYCLINE 100 MG/1
100 CAPSULE ORAL
Qty: 30 CAPSULE | Refills: 0 | Status: SHIPPED | OUTPATIENT
Start: 2024-10-11

## 2024-10-11 NOTE — TELEPHONE ENCOUNTER
Refill Routing Note   Medication(s) are not appropriate for processing by [facility] for the following reason(s):        Outside of protocol    [facility] action(s):  Route               Appointments  past 12m or future 3m with PCP    Date Provider   Last Visit   7/10/2024 Paulino Hope MD   Next Visit   Visit date not found Paulino Hope MD   ED visits in past 90 days: 0        Note composed:3:20 PM 10/11/2024

## 2024-11-08 DIAGNOSIS — L71.9 ROSACEA: ICD-10-CM

## 2024-11-08 RX ORDER — DOXYCYCLINE 100 MG/1
100 CAPSULE ORAL
Qty: 30 CAPSULE | Refills: 0 | Status: SHIPPED | OUTPATIENT
Start: 2024-11-08

## 2024-12-14 DIAGNOSIS — L71.9 ROSACEA: ICD-10-CM

## 2024-12-15 RX ORDER — DOXYCYCLINE 100 MG/1
100 CAPSULE ORAL DAILY
Qty: 30 CAPSULE | Refills: 0 | Status: SHIPPED | OUTPATIENT
Start: 2024-12-15

## 2025-01-16 DIAGNOSIS — L71.9 ROSACEA: ICD-10-CM

## 2025-01-16 RX ORDER — DOXYCYCLINE 100 MG/1
100 CAPSULE ORAL DAILY
Qty: 30 CAPSULE | Refills: 0 | Status: SHIPPED | OUTPATIENT
Start: 2025-01-16

## 2025-02-12 DIAGNOSIS — L71.9 ROSACEA: ICD-10-CM

## 2025-02-12 RX ORDER — DOXYCYCLINE 100 MG/1
100 CAPSULE ORAL
Qty: 30 CAPSULE | Refills: 0 | Status: SHIPPED | OUTPATIENT
Start: 2025-02-12

## 2025-02-12 NOTE — TELEPHONE ENCOUNTER
Refill Routing Note   Medication(s) are not appropriate for processing by [facility] for the following reason(s):        Outside of protocol    [facility] action(s):  Route               Appointments  past 12m or future 3m with PCP    Date Provider   Last Visit   7/10/2024 Paulino Hope MD   Next Visit   7/10/2025 Paulino Hope MD   ED visits in past 90 days: 0        Note composed:1:34 PM 02/12/2025

## 2025-03-12 DIAGNOSIS — L71.9 ROSACEA: ICD-10-CM

## 2025-03-12 NOTE — TELEPHONE ENCOUNTER
Refill Routing Note   Medication(s) are not appropriate for processing by [facility] for the following reason(s):        Outside of protocol    [facility] action(s):  Route               Appointments  past 12m or future 3m with PCP    Date Provider   Last Visit   7/10/2024 Paulino Hope MD   Next Visit   7/10/2025 Paulino Hope MD   ED visits in past 90 days: 0        Note composed:11:42 AM 03/12/2025

## 2025-03-13 RX ORDER — DOXYCYCLINE 100 MG/1
100 CAPSULE ORAL
Qty: 30 CAPSULE | Refills: 0 | Status: SHIPPED | OUTPATIENT
Start: 2025-03-13

## 2025-04-11 DIAGNOSIS — L71.9 ROSACEA: ICD-10-CM

## 2025-04-11 RX ORDER — DOXYCYCLINE 100 MG/1
100 CAPSULE ORAL
Qty: 30 CAPSULE | Refills: 0 | Status: SHIPPED | OUTPATIENT
Start: 2025-04-11

## 2025-05-10 DIAGNOSIS — L71.9 ROSACEA: ICD-10-CM

## 2025-05-12 RX ORDER — DOXYCYCLINE 100 MG/1
100 CAPSULE ORAL DAILY
Qty: 30 CAPSULE | Refills: 0 | Status: SHIPPED | OUTPATIENT
Start: 2025-05-12

## 2025-06-12 DIAGNOSIS — L71.9 ROSACEA: ICD-10-CM

## 2025-06-12 RX ORDER — DOXYCYCLINE 100 MG/1
100 CAPSULE ORAL DAILY
Qty: 30 CAPSULE | Refills: 0 | Status: SHIPPED | OUTPATIENT
Start: 2025-06-12

## 2025-06-30 ENCOUNTER — PATIENT MESSAGE (OUTPATIENT)
Dept: FAMILY MEDICINE | Facility: CLINIC | Age: 60
End: 2025-06-30
Payer: COMMERCIAL

## 2025-06-30 DIAGNOSIS — Z00.00 PHYSICAL EXAM, ROUTINE: ICD-10-CM

## 2025-07-02 DIAGNOSIS — E78.2 MIXED HYPERLIPIDEMIA: ICD-10-CM

## 2025-07-02 RX ORDER — ATORVASTATIN CALCIUM 20 MG/1
20 TABLET, FILM COATED ORAL
Qty: 90 TABLET | Refills: 0 | Status: SHIPPED | OUTPATIENT
Start: 2025-07-02

## 2025-07-02 NOTE — TELEPHONE ENCOUNTER
Care Due:                  Date            Visit Type   Department     Provider  --------------------------------------------------------------------------------                                EP -                              [facility]  Last Visit: 07-      CARE ([facility])   SHERIN Hope                               -                              [facility]  Next Visit: 07-      CARE ([facility])   SHERIN Hope                                                            Last  Test          Frequency    Reason                     Performed    Due Date  --------------------------------------------------------------------------------    CMP.........  12 months..  atorvastatin.............  07- 06-    Lipid Panel.  12 months..  atorvastatin.............  07- 06-    [facility] Embedded Care Due Messages. Reference number: [id number].   7/02/2025 12:14:20 AM CDT

## 2025-07-02 NOTE — TELEPHONE ENCOUNTER
Refill Routing Note   Medication(s) are not appropriate for processing by [facility] for the following reason(s):        Required labs outdated    [facility] action(s):  Defer      Medication Therapy Plan: FLOS      Appointments  past 12m or future 3m with PCP    Date Provider   Last Visit   7/10/2024 Paulino Hope MD   Next Visit   7/10/2025 Paulino Hope MD   ED visits in past 90 days: 0        Note composed:7:51 AM 07/02/2025

## 2025-07-08 ENCOUNTER — LAB VISIT (OUTPATIENT)
Dept: LAB | Facility: HOSPITAL | Age: 60
End: 2025-07-08
Attending: INTERNAL MEDICINE
Payer: COMMERCIAL

## 2025-07-08 DIAGNOSIS — Z00.00 PHYSICAL EXAM, ROUTINE: ICD-10-CM

## 2025-07-08 LAB
ALBUMIN SERPL BCP-MCNC: 4.6 G/DL (ref 3.5–5.2)
ALP SERPL-CCNC: 80 UNIT/L (ref 40–150)
ALT SERPL W/O P-5'-P-CCNC: 32 UNIT/L (ref 10–44)
ANION GAP (OHS): 10 MMOL/L (ref 8–16)
AST SERPL-CCNC: 27 UNIT/L (ref 11–45)
BILIRUB SERPL-MCNC: 0.5 MG/DL (ref 0.1–1)
BUN SERPL-MCNC: 13 MG/DL (ref 6–20)
CALCIUM SERPL-MCNC: 9.4 MG/DL (ref 8.7–10.5)
CHLORIDE SERPL-SCNC: 106 MMOL/L (ref 95–110)
CHOLEST SERPL-MCNC: 200 MG/DL (ref 120–199)
CHOLEST/HDLC SERPL: 3.3 {RATIO} (ref 2–5)
CO2 SERPL-SCNC: 27 MMOL/L (ref 23–29)
CREAT SERPL-MCNC: 0.8 MG/DL (ref 0.5–1.4)
EAG (OHS): 146 MG/DL (ref 68–131)
ERYTHROCYTE [DISTWIDTH] IN BLOOD BY AUTOMATED COUNT: 14.6 % (ref 11.5–14.5)
GFR SERPLBLD CREATININE-BSD FMLA CKD-EPI: >60 ML/MIN/1.73/M2
GLUCOSE SERPL-MCNC: 135 MG/DL (ref 70–110)
HBA1C MFR BLD: 6.7 % (ref 4–5.6)
HCT VFR BLD AUTO: 42 % (ref 37–48.5)
HDLC SERPL-MCNC: 61 MG/DL (ref 40–75)
HDLC SERPL: 30.5 % (ref 20–50)
HGB BLD-MCNC: 13.2 GM/DL (ref 12–16)
LDLC SERPL CALC-MCNC: 107.8 MG/DL (ref 63–159)
MCH RBC QN AUTO: 28.1 PG (ref 27–31)
MCHC RBC AUTO-ENTMCNC: 31.4 G/DL (ref 32–36)
MCV RBC AUTO: 90 FL (ref 82–98)
NONHDLC SERPL-MCNC: 139 MG/DL
PLATELET # BLD AUTO: 284 K/UL (ref 150–450)
PMV BLD AUTO: 11.6 FL (ref 9.2–12.9)
POTASSIUM SERPL-SCNC: 5.2 MMOL/L (ref 3.5–5.1)
PROT SERPL-MCNC: 7.8 GM/DL (ref 6–8.4)
RBC # BLD AUTO: 4.69 M/UL (ref 4–5.4)
SODIUM SERPL-SCNC: 143 MMOL/L (ref 136–145)
TRIGL SERPL-MCNC: 156 MG/DL (ref 30–150)
WBC # BLD AUTO: 8.16 K/UL (ref 3.9–12.7)

## 2025-07-08 PROCEDURE — 36415 COLL VENOUS BLD VENIPUNCTURE: CPT | Mod: PO

## 2025-07-08 PROCEDURE — 85027 COMPLETE CBC AUTOMATED: CPT

## 2025-07-08 PROCEDURE — 83036 HEMOGLOBIN GLYCOSYLATED A1C: CPT

## 2025-07-08 PROCEDURE — 80061 LIPID PANEL: CPT

## 2025-07-08 PROCEDURE — 80053 COMPREHEN METABOLIC PANEL: CPT

## 2025-07-10 ENCOUNTER — OFFICE VISIT (OUTPATIENT)
Dept: FAMILY MEDICINE | Facility: CLINIC | Age: 60
End: 2025-07-10
Payer: COMMERCIAL

## 2025-07-10 VITALS
DIASTOLIC BLOOD PRESSURE: 86 MMHG | BODY MASS INDEX: 29.41 KG/M2 | WEIGHT: 183 LBS | SYSTOLIC BLOOD PRESSURE: 132 MMHG | OXYGEN SATURATION: 97 % | HEART RATE: 96 BPM | HEIGHT: 66 IN

## 2025-07-10 DIAGNOSIS — E78.2 MIXED HYPERLIPIDEMIA: ICD-10-CM

## 2025-07-10 DIAGNOSIS — Z12.31 ENCOUNTER FOR SCREENING MAMMOGRAM FOR BREAST CANCER: ICD-10-CM

## 2025-07-10 DIAGNOSIS — E11.9 TYPE 2 DIABETES MELLITUS WITHOUT COMPLICATION, WITHOUT LONG-TERM CURRENT USE OF INSULIN: ICD-10-CM

## 2025-07-10 DIAGNOSIS — Z00.00 PHYSICAL EXAM, ROUTINE: Primary | ICD-10-CM

## 2025-07-10 LAB
ALBUMIN/CREAT UR: 8.1 UG/MG
CREAT UR-MCNC: 148 MG/DL (ref 15–325)
MICROALBUMIN UR-MCNC: 12 UG/ML (ref ?–5000)

## 2025-07-10 PROCEDURE — 3044F HG A1C LEVEL LT 7.0%: CPT | Mod: CPTII,S$GLB,, | Performed by: INTERNAL MEDICINE

## 2025-07-10 PROCEDURE — 99396 PREV VISIT EST AGE 40-64: CPT | Mod: S$GLB,,, | Performed by: INTERNAL MEDICINE

## 2025-07-10 PROCEDURE — 1159F MED LIST DOCD IN RCRD: CPT | Mod: CPTII,S$GLB,, | Performed by: INTERNAL MEDICINE

## 2025-07-10 PROCEDURE — 99999 PR PBB SHADOW E&M-EST. PATIENT-LVL III: CPT | Mod: PBBFAC,,, | Performed by: INTERNAL MEDICINE

## 2025-07-10 PROCEDURE — 3079F DIAST BP 80-89 MM HG: CPT | Mod: CPTII,S$GLB,, | Performed by: INTERNAL MEDICINE

## 2025-07-10 PROCEDURE — 3008F BODY MASS INDEX DOCD: CPT | Mod: CPTII,S$GLB,, | Performed by: INTERNAL MEDICINE

## 2025-07-10 PROCEDURE — 3075F SYST BP GE 130 - 139MM HG: CPT | Mod: CPTII,S$GLB,, | Performed by: INTERNAL MEDICINE

## 2025-07-10 PROCEDURE — 82043 UR ALBUMIN QUANTITATIVE: CPT | Performed by: INTERNAL MEDICINE

## 2025-07-10 PROCEDURE — 1160F RVW MEDS BY RX/DR IN RCRD: CPT | Mod: CPTII,S$GLB,, | Performed by: INTERNAL MEDICINE

## 2025-07-10 RX ORDER — ATORVASTATIN CALCIUM 20 MG/1
20 TABLET, FILM COATED ORAL NIGHTLY
Qty: 90 TABLET | Refills: 3 | Status: SHIPPED | OUTPATIENT
Start: 2025-07-10

## 2025-07-10 RX ORDER — METFORMIN HYDROCHLORIDE 500 MG/1
500 TABLET, EXTENDED RELEASE ORAL
Qty: 90 TABLET | Refills: 3 | Status: SHIPPED | OUTPATIENT
Start: 2025-07-10 | End: 2026-07-10

## 2025-07-10 NOTE — PROGRESS NOTES
Subjective     Abigail Smith is a 59 y.o. old, female here for a health maintenance visit.    60 y/o with PMH of Type 2 DM, HLD   Wt Readings from Last 3 Encounters:   07/10/25 0837 83 kg (182 lb 15.7 oz)   07/10/24 0840 81.4 kg (179 lb 9 oz)   24 0849 79.4 kg (175 lb)     Hemoglobin A1C (%)   Date Value   2024 6.0 (H)   2023 5.8 (H)   2022 5.9 (H)     Hemoglobin A1c (%)   Date Value   2025 6.7 (H)     Reviewed labs. New dx of Type 2 DM after years of prediabetes and being quite diligent with her low carb diet. Strong FH of DM in her mother. She goes regularly to the eye doctor. She is currently asymptomatic. H/o gestational DM.    Health Habits  Exercise and Activity: fairly active, but could spend some more time in the gym or doing regular cardio  Diet: more fruit lately, otherwise low carb, prepares gluten free meals for grand daughter    Sexual Health  Sexually active: no major issues    Mental Health  Financial struggles, grand daughter's health issues    History     Past Medical History:   Diagnosis Date    Diabetes mellitus     gestational; pre-diabetic as of 23    Gallstones     Mixed hyperlipidemia      Past Surgical History:   Procedure Laterality Date    ARTHROSCOPIC CHONDROPLASTY OF KNEE JOINT Left 2024    Procedure: ARTHROSCOPY, KNEE, WITH CHONDROPLASTY;  Surgeon: Jeyson Reyes MD;  Location: [facility] OR;  Service: Orthopedics;  Laterality: Left;  knee arthroscopy chondroplasty     SECTION  10/19/1994    CHOLECYSTECTOMY  2022    HYSTERECTOMY  10/2012    Partial - cervix & ovaries remain    LAPAROSCOPIC CHOLECYSTECTOMY N/A 2022    Procedure: CHOLECYSTECTOMY, LAPAROSCOPIC;  Surgeon: Justin Champagne MD;  Location: [facility] OR;  Service: General;  Laterality: N/A;    TONSILLECTOMY  2002     Review of patient's allergies indicates:   Allergen Reactions    Iodine and iodide containing products Hives    Sulfa (sulfonamide antibiotics)     Scallops Nausea  "Only     Outpatient Medications Marked as Taking for the 7/10/25 encounter (Office Visit) with Paulino Hope MD   Medication Sig Dispense Refill    doxycycline (MONODOX) 100 MG capsule Take 1 capsule (100 mg total) by mouth once daily. 30 capsule 0    [DISCONTINUED] atorvastatin (LIPITOR) 20 MG tablet TAKE 1 TABLET BY MOUTH EVERY DAY 90 tablet 0     Social History[1]  Family History   Problem Relation Name Age of Onset    Diabetes Mother Bouchra Gaming     Hearing loss Mother Bouchra Gaming     Hyperlipidemia Mother Bouchra Gaming     Arthritis Father Michael Gaming         RA    Hearing loss Father Michael Gaming     Diabetes Father Michael Gaming     Arthritis Sister Eneida Sena         Diagnosed with RA at 25 years of age       Review of Systems   Review of Systems   All other systems reviewed and are negative.    Objective   /86   Pulse 96   Ht 5' 6" (1.676 m)   Wt 83 kg (182 lb 15.7 oz)   SpO2 97%   BMI 29.53 kg/m²   Physical Exam  Constitutional:       General: She is not in acute distress.     Appearance: Normal appearance. She is well-developed.   HENT:      Head: Normocephalic and atraumatic.   Eyes:      Conjunctiva/sclera: Conjunctivae normal.      Pupils: Pupils are equal, round, and reactive to light.   Neck:      Thyroid: No thyroid mass or thyromegaly.   Cardiovascular:      Rate and Rhythm: Normal rate and regular rhythm.      Pulses:           Dorsalis pedis pulses are 2+ on the right side and 2+ on the left side.        Posterior tibial pulses are 2+ on the right side and 2+ on the left side.      Heart sounds: Normal heart sounds. No murmur heard.  Pulmonary:      Effort: No respiratory distress.      Breath sounds: Normal breath sounds.   Abdominal:      General: Bowel sounds are normal.      Palpations: Abdomen is soft.      Tenderness: There is no abdominal tenderness.   Musculoskeletal:         General: No deformity.      Cervical back: Neck supple.   Feet:      Right foot: "      Protective Sensation: 5 sites tested.  5 sites sensed.      Skin integrity: Skin integrity normal.      Toenail Condition: Right toenails are normal.      Left foot:      Protective Sensation: 5 sites tested.  5 sites sensed.      Skin integrity: Skin integrity normal.      Toenail Condition: Left toenails are normal.   Lymphadenopathy:      Cervical: No cervical adenopathy.      Upper Body:      Right upper body: No supraclavicular adenopathy.      Left upper body: No supraclavicular adenopathy.   Skin:     General: Skin is warm and dry.      Findings: No rash.   Neurological:      Mental Status: She is alert and oriented to person, place, and time.   Psychiatric:         Behavior: Behavior normal.       Assessment and Plan     Physical exam, routine    Mixed hyperlipidemia  -     atorvastatin (LIPITOR) 20 MG tablet; Take 1 tablet (20 mg total) by mouth every evening.  Dispense: 90 tablet; Refill: 3    Encounter for screening mammogram for breast cancer  -     Mammo Digital Screening Bilat w/ Mark (XPD); Future; Expected date: 07/10/2025    Type 2 diabetes mellitus without complication, without long-term current use of insulin  -     Microalbumin/Creatinine Ratio, Urine; Future; Expected date: 07/10/2025  -     Ambulatory referral/consult to Diabetes Education; Future; Expected date: 07/17/2025  -     metFORMIN (GLUCOPHAGE-XR) 500 MG ER 24hr tablet; Take 1 tablet (500 mg total) by mouth daily with breakfast.  Dispense: 90 tablet; Refill: 3      Start metformin, obtain eye exam records, Urine microalbumin. Education done regarding DM diagnosis. Est with diabetic education.  F/u 6 months with labs.    ___________________  Paulino Hope MD  Internal Medicine and Pediatrics         [1]   Social History  Tobacco Use    Smoking status: Never    Smokeless tobacco: Never   Substance Use Topics    Alcohol use: Yes     Alcohol/week: 4.0 standard drinks of alcohol     Types: 3 Glasses of wine, 1 Cans of beer per week      Comment: ocassional    Drug use: Never

## 2025-07-11 ENCOUNTER — HOSPITAL ENCOUNTER (OUTPATIENT)
Dept: RADIOLOGY | Facility: HOSPITAL | Age: 60
Discharge: HOME | End: 2025-07-11
Attending: INTERNAL MEDICINE
Payer: COMMERCIAL

## 2025-07-11 DIAGNOSIS — Z12.31 ENCOUNTER FOR SCREENING MAMMOGRAM FOR BREAST CANCER: ICD-10-CM

## 2025-07-11 PROCEDURE — 77063 BREAST TOMOSYNTHESIS BI: CPT | Mod: 26,,, | Performed by: RADIOLOGY

## 2025-07-11 PROCEDURE — 77067 SCR MAMMO BI INCL CAD: CPT | Mod: 26,,, | Performed by: RADIOLOGY

## 2025-07-11 PROCEDURE — 77063 BREAST TOMOSYNTHESIS BI: CPT | Mod: TC,PO

## 2025-07-14 DIAGNOSIS — L71.9 ROSACEA: ICD-10-CM

## 2025-07-14 RX ORDER — DOXYCYCLINE 100 MG/1
100 CAPSULE ORAL
Qty: 30 CAPSULE | Refills: 0 | Status: SHIPPED | OUTPATIENT
Start: 2025-07-14

## 2025-07-14 NOTE — TELEPHONE ENCOUNTER
Refill Routing Note   Medication(s) are not appropriate for processing by [facility] for the following reason(s):        Outside of protocol    [facility] action(s):  Route             Appointments  past 12m or future 3m with PCP    Date Provider   Last Visit   7/10/2025 Paulino Hope MD   Next Visit   1/9/2026 Paulino Hope MD   ED visits in past 90 days: 0        Note composed:8:40 AM 07/14/2025

## 2025-07-16 ENCOUNTER — PATIENT MESSAGE (OUTPATIENT)
Dept: FAMILY MEDICINE | Facility: CLINIC | Age: 60
End: 2025-07-16
Payer: COMMERCIAL

## 2025-07-16 RX ORDER — METRONIDAZOLE TOPICAL 7.5 MG/G
GEL TOPICAL 2 TIMES DAILY
Qty: 45 G | Refills: 1 | Status: SHIPPED | OUTPATIENT
Start: 2025-07-16 | End: 2026-07-16

## 2025-07-25 ENCOUNTER — PATIENT MESSAGE (OUTPATIENT)
Dept: DIABETES | Facility: CLINIC | Age: 60
End: 2025-07-25
Payer: COMMERCIAL

## 2025-08-13 ENCOUNTER — PATIENT MESSAGE (OUTPATIENT)
Dept: DIABETES | Facility: CLINIC | Age: 60
End: 2025-08-13
Payer: COMMERCIAL

## 2025-09-02 DIAGNOSIS — M25.562 ACUTE PAIN OF LEFT KNEE: Primary | ICD-10-CM

## 2025-09-03 ENCOUNTER — CLINICAL SUPPORT (OUTPATIENT)
Dept: DIABETES | Facility: CLINIC | Age: 60
End: 2025-09-03
Payer: COMMERCIAL

## 2025-09-03 DIAGNOSIS — E11.9 DIABETES MELLITUS WITHOUT COMPLICATION: Primary | ICD-10-CM

## 2025-09-03 PROCEDURE — G0108 DIAB MANAGE TRN  PER INDIV: HCPCS | Mod: 95,,, | Performed by: DIETITIAN, REGISTERED

## 2025-09-04 ENCOUNTER — OFFICE VISIT (OUTPATIENT)
Dept: ORTHOPEDICS | Facility: CLINIC | Age: 60
End: 2025-09-04
Payer: COMMERCIAL

## 2025-09-04 ENCOUNTER — HOSPITAL ENCOUNTER (OUTPATIENT)
Dept: RADIOLOGY | Facility: HOSPITAL | Age: 60
Discharge: HOME | End: 2025-09-04
Attending: ORTHOPAEDIC SURGERY
Payer: COMMERCIAL

## 2025-09-04 DIAGNOSIS — M25.562 ACUTE PAIN OF LEFT KNEE: Primary | ICD-10-CM

## 2025-09-04 DIAGNOSIS — M25.562 ACUTE PAIN OF LEFT KNEE: ICD-10-CM

## 2025-09-04 PROCEDURE — 73562 X-RAY EXAM OF KNEE 3: CPT | Mod: 26,LT,, | Performed by: RADIOLOGY

## 2025-09-04 PROCEDURE — 1159F MED LIST DOCD IN RCRD: CPT | Mod: CPTII,S$GLB,, | Performed by: ORTHOPAEDIC SURGERY

## 2025-09-04 PROCEDURE — 73560 X-RAY EXAM OF KNEE 1 OR 2: CPT | Mod: 26,RT,, | Performed by: RADIOLOGY

## 2025-09-04 PROCEDURE — 3044F HG A1C LEVEL LT 7.0%: CPT | Mod: CPTII,S$GLB,, | Performed by: ORTHOPAEDIC SURGERY

## 2025-09-04 PROCEDURE — 99215 OFFICE O/P EST HI 40 MIN: CPT | Mod: 57,S$GLB,, | Performed by: ORTHOPAEDIC SURGERY

## 2025-09-04 PROCEDURE — 99999 PR PBB SHADOW E&M-EST. PATIENT-LVL II: CPT | Mod: PBBFAC,,, | Performed by: ORTHOPAEDIC SURGERY

## 2025-09-04 PROCEDURE — 73560 X-RAY EXAM OF KNEE 1 OR 2: CPT | Mod: TC,PO,RT

## (undated) DEVICE — STOCKINET TUBULAR 1 PLY 6X60IN

## (undated) DEVICE — SEE MEDLINE ITEM 154981

## (undated) DEVICE — DURAPREP SURG SCRUB 26ML

## (undated) DEVICE — TOWEL OR DISP STRL BLUE 4/PK

## (undated) DEVICE — ELECTRODE REM PLYHSV RETURN 9

## (undated) DEVICE — UNDERGLOVES BIOGEL PI SIZE 8

## (undated) DEVICE — DRESSING XEROFORM FOIL PK 1X8

## (undated) DEVICE — GLOVE SURG BIOGEL LATEX SZ 7.5

## (undated) DEVICE — BANDAGE ELAS SOFTWRAP ST 6X5YD

## (undated) DEVICE — ULTRAGATOR 3.5MM

## (undated) DEVICE — BLADE GATOR 3.5 6 EACH/BOX

## (undated) DEVICE — DRAPE ABDOMINAL TIBURON 14X11

## (undated) DEVICE — CANNULA ENDOPATH XCEL 5X100MM

## (undated) DEVICE — TUBING SUC UNIV W/CONN 12FT

## (undated) DEVICE — GAUZE SPONGE 4X4 12PLY

## (undated) DEVICE — APPLIER CLIP ENDO LIGAMAX 5MM

## (undated) DEVICE — KIT ANTIFOG

## (undated) DEVICE — SOL IRR NACL .9% 3000ML

## (undated) DEVICE — NDL 22GA X1 1/2 REG BEVEL

## (undated) DEVICE — DRAPE THREE-QTR REINF 53X77IN

## (undated) DEVICE — TROCAR ENDOPATH XCEL 12X100MM

## (undated) DEVICE — CLOSURE SKIN STERI STRIP 1/2X4

## (undated) DEVICE — PAD PREP CUFFED NS 24X48IN

## (undated) DEVICE — MAT QUICK 40X30 FLOOR FLUID LF

## (undated) DEVICE — SYR 30CC LUER LOCK

## (undated) DEVICE — SUT MONOCRYL 4-0 PS-2

## (undated) DEVICE — SUT ETHILON 3-0 PS2 18 BLK

## (undated) DEVICE — SEE L#120831

## (undated) DEVICE — APPLICATOR CHLORAPREP ORN 26ML

## (undated) DEVICE — SCISSOR 5MMX35CM DIRECT DRIVE

## (undated) DEVICE — TUBE SET INFLOW/OUTFLOW

## (undated) DEVICE — GLOVE BIOGEL ECLIPSE SZ 7.5

## (undated) DEVICE — DRAPE HALF SURGICAL 40X58IN

## (undated) DEVICE — BAG TISS RETRV MONARCH 10MM

## (undated) DEVICE — PAD CAST SPECIALIST STRL 6

## (undated) DEVICE — Device

## (undated) DEVICE — TROCAR ENDOPATH XCEL 5X100MM

## (undated) DEVICE — DRAPE EXTREMITY W/ABC NON-SLIP

## (undated) DEVICE — BLADE SURG CARBON STEEL SZ11

## (undated) DEVICE — NEPTUNE 4 PORT MANIFOLD

## (undated) DEVICE — SUT 0 VICRYL / UR6 (J603)

## (undated) DEVICE — NDL SPINAL 18GX3.5 SPINOCAN

## (undated) DEVICE — TRAY SKIN SCRUB WET PREMIUM

## (undated) DEVICE — COVER PROXIMA MAYO STAND

## (undated) DEVICE — TUBING HEATED INSUFFLATOR